# Patient Record
Sex: FEMALE | Race: WHITE | NOT HISPANIC OR LATINO | Employment: FULL TIME | URBAN - METROPOLITAN AREA
[De-identification: names, ages, dates, MRNs, and addresses within clinical notes are randomized per-mention and may not be internally consistent; named-entity substitution may affect disease eponyms.]

---

## 2017-10-17 ENCOUNTER — OFFICE VISIT (OUTPATIENT)
Dept: MEDICAL GROUP | Facility: MEDICAL CENTER | Age: 62
End: 2017-10-17
Payer: COMMERCIAL

## 2017-10-17 VITALS
SYSTOLIC BLOOD PRESSURE: 110 MMHG | BODY MASS INDEX: 23.24 KG/M2 | HEIGHT: 71 IN | WEIGHT: 166 LBS | RESPIRATION RATE: 20 BRPM | DIASTOLIC BLOOD PRESSURE: 78 MMHG | TEMPERATURE: 98.4 F | OXYGEN SATURATION: 97 % | HEART RATE: 78 BPM

## 2017-10-17 DIAGNOSIS — Z76.89 ENCOUNTER TO ESTABLISH CARE WITH NEW DOCTOR: ICD-10-CM

## 2017-10-17 DIAGNOSIS — A60.00 HERPES SIMPLEX INFECTION OF GENITOURINARY SYSTEM: ICD-10-CM

## 2017-10-17 PROCEDURE — 99203 OFFICE O/P NEW LOW 30 MIN: CPT | Performed by: INTERNAL MEDICINE

## 2017-10-17 RX ORDER — ACYCLOVIR 400 MG/1
400 TABLET ORAL 3 TIMES DAILY
Qty: 60 TAB | Refills: 1 | Status: SHIPPED | OUTPATIENT
Start: 2017-10-17 | End: 2018-11-17 | Stop reason: SDUPTHER

## 2017-10-17 RX ORDER — LATANOPROST 50 UG/ML
1 SOLUTION/ DROPS OPHTHALMIC NIGHTLY
COMMUNITY
End: 2022-01-12

## 2017-10-17 RX ORDER — ACYCLOVIR 400 MG/1
400 TABLET ORAL EVERY 8 HOURS PRN
COMMUNITY
End: 2019-05-07

## 2017-10-17 ASSESSMENT — PATIENT HEALTH QUESTIONNAIRE - PHQ9: CLINICAL INTERPRETATION OF PHQ2 SCORE: 0

## 2017-10-17 NOTE — LETTER
[facility]  Cookie Quesada M.D.  [address]  [place] [address]  Fax: [phone]   Authorization for Release/Disclosure of   Protected Health Information   Name: JAZ VIRGEN : 1955 SSN: [id number]   Address: [facility] [address]  [place] [address] Phone:    [phone] (home)    I authorize the entity listed below to release/disclose the PHI below to:   [facility] [employer] [facility]/Cookie Quesada M.D. and Cookie Quesada M.D.   Provider or Entity Name:     Address   City, State, Zip   Phone:      Fax:     Reason for request: continuity of care   Information to be released:    [  ] LAST COLONOSCOPY,  including any PATH REPORT and follow-up  [  ] LAST FIT/COLOGUARD RESULT [  ] LAST DEXA  [  ] LAST MAMMOGRAM  [  ] LAST PAP  [  ] LAST LABS [  ] RETINA EXAM REPORT  [  ] IMMUNIZATION RECORDS  [  ] Release all info      [  ] Check here and initial the line next to each item to release ALL health information INCLUDING  _____ Care and treatment for drug and / or alcohol abuse  _____ HIV testing, infection status, or AIDS  _____ Genetic Testing    DATES OF SERVICE OR TIME PERIOD TO BE DISCLOSED: _____________  I understand and acknowledge that:  * This Authorization may be revoked at any time by you in writing, except if your health information has already been used or disclosed.  * Your health information that will be used or disclosed as a result of you signing this authorization could be re-disclosed by the recipient. If this occurs, your re-disclosed health information may no longer be protected by State or Federal laws.  * You may refuse to sign this Authorization. Your refusal will not affect your ability to obtain treatment.  * This Authorization becomes effective upon signing and will  on (date) __________.      If no date is indicated, this Authorization will  one (1) year from the signature date.    Name: Jaz Virgen    Signature:   Date:     10/17/2017       PLEASE FAX  REQUESTED RECORDS BACK TO: [phone]

## 2017-10-17 NOTE — PROGRESS NOTES
Subjective:   Loli Pond is a 62 y.o. female here today to establish care and       \      1. Encounter to establish care with new doctor    Patient recently moved from the [facility] to [place]. Her partner has a business which relocated here. In general the patient is very healthy, she has no history of any significant health issues. She did have breast cyst which was aspirated several months ago, she is scheduled to go back for diagnostic mammogram and  repeat ultrasound in a couple of months. She takes eyedrops for glaucoma, that is her only routine medication.    2. Herpes simplex infection of genitourinary system    Patient has a history of HSV-2, she takes acyclovir as needed for outbreaks. The frequency of her outbreaks is variable usually every several months.      Current medicines (including changes today)  Current Outpatient Prescriptions   Medication Sig Dispense Refill   • latanoprost (XALATAN) 0.005 % Solution Place 1 Drop in both eyes every evening.     • acyclovir (ZOVIRAX) 400 MG tablet Take 1 Tab by mouth 3 times a day. 60 Tab 1     No current facility-administered medications for this visit.      She  has no past medical history on file.    Social History     Social History   • Marital status: Single     Spouse name: N/A   • Number of children: N/A   • Years of education: N/A     Social History Main Topics   • Smoking status: Never Smoker   • Smokeless tobacco: Never Used   • Alcohol use 1.2 oz/week     2 Glasses of wine per week   • Drug use: No   • Sexual activity: Yes     Partners: Male     Other Topics Concern   • Caffeine Concern No   • Sleep Concern No   • Weight Concern Yes   • Special Diet No   • Exercise Yes     Social History Narrative   • No narrative on file     Family History   Problem Relation Age of Onset   • Colon Cancer Mother    • Heart Attack Father        ROS       - Constitutional: Negative for fever, chills, unexpected weight change, and fatigue/generalized  "weakness.     - HEENT: Negative for headaches, vision changes      - Respiratory: Negative for cough, Shortness of breath    - Cardiovascular: Negative for chest pain and bilateral lower extremity edema.     - Gastrointestinal: Negative for heartburn,  abdominal pain,  diarrhea, constipation     - Genitourinary: Negative for dysuria  and urinary incontinence.    - Musculoskeletal: Negative for  back pain and joint pain.     - Skin: Negative for rash     - Neurological: Negative for dizziness,  tremors, focal weakness     - Psychiatric/Behavioral: Negative for depression and memory loss.             Objective:     Blood pressure 110/78, pulse 78, temperature 36.9 °C (98.4 °F), resp. rate 20, height 1.803 m (5' 11\"), weight 75.3 kg (166 lb), SpO2 97 %. Body mass index is 23.15 kg/m².     Physical Exam:  Constitutional: Alert, no distress.  Skin: Warm, dry, good turgor, no rashes in visible areas.  Eye: Equal, round and reactive, conjunctiva clear, lids normal.  ENMT: Lips without lesions, good dentition, oropharynx clear. Hearing grossly intact.  Neck: No masses, no thyromegaly. No cervical or supraclavicular lymphadenopathy  Respiratory: Unlabored respiratory effort, lungs clear to auscultation, no wheezes, no rhonchi.  Cardiovascular: Regular rate and rhythm, without murmur, no edema.  Abdomen: Soft, non-tender, no masses, no hepatosplenomegaly.  Psych: Alert and oriented x3, normal affect and mood. Insight and judgment good            Assessment and Plan:   The following treatment plan was discussed    1. Encounter to establish care with new doctor    At this time the patient is up-to-date on all of her routine health maintenance. She had a colonoscopy done just a couple of months ago and it was negative. We will obtain these records. She has no history of elevated cholesterol or blood glucose. The patient maintains an active lifestyle.    2. Herpes simplex infection of genitourinary system      - acyclovir " (ZOVIRAX) 400 MG tablet; Take 1 Tab by mouth 3 times a day.  Dispense: 60 Tab; Refill: 1      Followup: She will follow up annually sooner when necessary

## 2017-10-17 NOTE — LETTER
[facility]  Cookie Quesada M.D.  [address]  [place] [address]  Fax: [phone]   Authorization for Release/Disclosure of   Protected Health Information   Name: JAZ VIRGEN : 1955 SSN: [id number]   Address: [facility] [address]  [place] [address] Phone:    [phone] (home)    I authorize the entity listed below to release/disclose the PHI below to:   [facility]/Cookie Quesada M.D. and Cookie Quesada M.D.   Provider or Entity Name:     Address   City, State, Zip   Phone:      Fax:     Reason for request: continuity of care   Information to be released:    [  ] LAST COLONOSCOPY,  including any PATH REPORT and follow-up  [  ] LAST FIT/COLOGUARD RESULT [  ] LAST DEXA  [  ] LAST MAMMOGRAM  [  ] LAST PAP  [  ] LAST LABS [  ] RETINA EXAM REPORT  [  ] IMMUNIZATION RECORDS  [  ] Release all info      [  ] Check here and initial the line next to each item to release ALL health information INCLUDING  _____ Care and treatment for drug and / or alcohol abuse  _____ HIV testing, infection status, or AIDS  _____ Genetic Testing    DATES OF SERVICE OR TIME PERIOD TO BE DISCLOSED: _____________  I understand and acknowledge that:  * This Authorization may be revoked at any time by you in writing, except if your health information has already been used or disclosed.  * Your health information that will be used or disclosed as a result of you signing this authorization could be re-disclosed by the recipient. If this occurs, your re-disclosed health information may no longer be protected by State or Federal laws.  * You may refuse to sign this Authorization. Your refusal will not affect your ability to obtain treatment.  * This Authorization becomes effective upon signing and will  on (date) __________.      If no date is indicated, this Authorization will  one (1) year from the signature date.    Name: Jaz Virgen    Signature:   Date:     10/17/2017       PLEASE FAX  REQUESTED RECORDS BACK TO: [phone]

## 2017-12-20 ENCOUNTER — OFFICE VISIT (OUTPATIENT)
Dept: MEDICAL GROUP | Facility: MEDICAL CENTER | Age: 62
End: 2017-12-20
Payer: COMMERCIAL

## 2017-12-20 VITALS
HEIGHT: 71 IN | RESPIRATION RATE: 20 BRPM | DIASTOLIC BLOOD PRESSURE: 64 MMHG | HEART RATE: 104 BPM | SYSTOLIC BLOOD PRESSURE: 110 MMHG | BODY MASS INDEX: 22.82 KG/M2 | TEMPERATURE: 101.1 F | WEIGHT: 163 LBS | OXYGEN SATURATION: 95 %

## 2017-12-20 DIAGNOSIS — J11.1 INFLUENZA: ICD-10-CM

## 2017-12-20 PROBLEM — R05.9 COUGH: Status: ACTIVE | Noted: 2017-12-20

## 2017-12-20 LAB
FLUAV+FLUBV AG SPEC QL IA: NORMAL
INT CON NEG: NEGATIVE
INT CON POS: POSITIVE

## 2017-12-20 PROCEDURE — 87804 INFLUENZA ASSAY W/OPTIC: CPT | Performed by: FAMILY MEDICINE

## 2017-12-20 PROCEDURE — 99214 OFFICE O/P EST MOD 30 MIN: CPT | Performed by: FAMILY MEDICINE

## 2017-12-20 RX ORDER — OSELTAMIVIR PHOSPHATE 75 MG/1
75 CAPSULE ORAL 2 TIMES DAILY
Qty: 10 CAP | Refills: 0 | Status: SHIPPED | OUTPATIENT
Start: 2017-12-20 | End: 2018-03-15

## 2017-12-20 NOTE — PROGRESS NOTES
"[facility]  Progress Note  Established Patient    Subjective:   Loli Pond is a 62 y.o. female here today with a chief complaint of Cough. The patient is alone.     Influenza  The patient states that she has recently been spending a lot of time with her mother-in-law, however, her mother-in-law was just diagnosed with flu complicated by pneumonia and is currently hospitalized. Starting yesterday, the patient noticed some slight cough and congestion. The symptoms worsened this morning and she developed sudden onset myalgias with chills. She also has noticed a fever. She denies sore throat. She denies shortness of breath. She denies nausea, vomiting and diarrhea. She did not get her flu shot this year. She feels quite rundown. She does have some chest pain with the cough. She is not aware of any aggravating or alleviating factors. Her symptoms are quite severe.      Current Outpatient Prescriptions on File Prior to Visit   Medication Sig Dispense Refill   • latanoprost (XALATAN) 0.005 % Solution Place 1 Drop in both eyes every evening.     • acyclovir (ZOVIRAX) 400 MG tablet Take 1 Tab by mouth 3 times a day. 60 Tab 1     No current facility-administered medications on file prior to visit.        History reviewed. No pertinent past medical history.    Allergies: Latex and Tetanus toxoid    Surgical History:  has no past surgical history on file.    Family History: family history includes Colon Cancer in her mother; Heart Attack in her father.    Social History:  reports that she has never smoked. She has never used smokeless tobacco. She reports that she drinks about 1.2 oz of alcohol per week . She reports that she does not use drugs.    ROS:See history of present illness..        Objective:     Vitals:    12/20/17 1033 12/20/17 1058   BP: (!) 96/68 110/64   Pulse: (!) 104 (!) 104   Resp: 20    Temp: (!) 38.4 °C (101.1 °F)    SpO2: 95%    Weight: 73.9 kg (163 lb)    Height: 1.803 m (5' 11\")  "       Physical Exam:  General: Appears tired.  Cardio: regular rate and rhythm, no murmurs, rubs or gallops.   Resp: CTAB no w/r/r.   ENMT: Tympanic membranes normal bilaterally. No pharyngeal erythema, no tonsillar exudates. There is some anterior and posterior cervical lymphadenopathy. No woody induration to the floor of the mouth.  GI: Soft, nontender, nondistended.    Rapid flu: positive.    Assessment and Plan:     1. Influenza  The patient has influenza without evidence of suppurative complication.  - oseltamivir (TAMIFLU) 75 MG Cap; Take 1 Cap by mouth 2 times a day.  Dispense: 10 Cap; Refill: 0  - Discussed remaining hydrated, try to eat.  - ER precautions extensively discussed with patient, I don't see any indication for ER visit currently.  - Handout given.      Followup: Return if symptoms worsen or fail to improve.

## 2017-12-20 NOTE — PATIENT INSTRUCTIONS
"Influenza, Adult  Influenza (\"the flu\") is a viral infection of the respiratory tract. It occurs more often in winter months because people spend more time in close contact with one another. Influenza can make you feel very sick. Influenza easily spreads from person to person (contagious).  CAUSES   Influenza is caused by a virus that infects the respiratory tract. You can catch the virus by breathing in droplets from an infected person's cough or sneeze. You can also catch the virus by touching something that was recently contaminated with the virus and then touching your mouth, nose, or eyes.  RISKS AND COMPLICATIONS  You may be at risk for a more severe case of influenza if you smoke cigarettes, have diabetes, have chronic heart disease (such as heart failure) or lung disease (such as asthma), or if you have a weakened immune system. Elderly people and pregnant women are also at risk for more serious infections. The most common problem of influenza is a lung infection (pneumonia). Sometimes, this problem can require emergency medical care and may be life threatening.  SIGNS AND SYMPTOMS   Symptoms typically last 4 to 10 days and may include:  · Fever.  · Chills.  · Headache, body aches, and muscle aches.  · Sore throat.  · Chest discomfort and cough.  · Poor appetite.  · Weakness or feeling tired.  · Dizziness.  · Nausea or vomiting.  DIAGNOSIS   Diagnosis of influenza is often made based on your history and a physical exam. A nose or throat swab test can be done to confirm the diagnosis.  TREATMENT   In mild cases, influenza goes away on its own. Treatment is directed at relieving symptoms. For more severe cases, your health care provider may prescribe antiviral medicines to shorten the sickness. Antibiotic medicines are not effective because the infection is caused by a virus, not by bacteria.  HOME CARE INSTRUCTIONS  · Take medicines only as directed by your health care provider.  · Use a cool mist humidifier " to make breathing easier.  · Get plenty of rest until your temperature returns to normal. This usually takes 3 to 4 days.  · Drink enough fluid to keep your urine clear or pale yellow.  · Cover your mouth and nose when coughing or sneezing, and wash your hands well to prevent the virus from spreading.  · Stay home from work or school until the fever is gone for at least 1 full day.  PREVENTION   An annual influenza vaccination (flu shot) is the best way to avoid getting influenza. An annual flu shot is now routinely recommended for all adults in the [place]  SEEK MEDICAL CARE IF:  · You experience chest pain, your cough worsens, or you produce more mucus.  · You have nausea, vomiting, or diarrhea.  · Your fever returns or gets worse.  SEEK IMMEDIATE MEDICAL CARE IF:  · You have trouble breathing, you become short of breath, or your skin or nails become bluish.  · You have severe pain or stiffness in the neck.  · You develop a sudden headache, or pain in the face or ear.  · You have nausea or vomiting that you cannot control.  MAKE SURE YOU:   · Understand these instructions.  · Will watch your condition.  · Will get help right away if you are not doing well or get worse.     This information is not intended to replace advice given to you by your health care provider. Make sure you discuss any questions you have with your health care provider.     Document Released: 12/15/2001 Document Revised: 01/08/2016 Document Reviewed: 03/18/2013  [employer] Patient Education ©2016 [employer]

## 2017-12-20 NOTE — ASSESSMENT & PLAN NOTE
The patient states that she has recently been spending a lot of time with her mother-in-law, however, her mother-in-law was just diagnosed with flu complicated by pneumonia and is currently hospitalized. Starting yesterday, the patient noticed some slight cough and congestion. The symptoms worsened this morning and she developed sudden onset myalgias with chills. She also has noticed a fever. She denies sore throat. She denies shortness of breath. She denies nausea, vomiting and diarrhea. She did not get her flu shot this year. She feels quite rundown. She does have some chest pain with the cough. She is not aware of any aggravating or alleviating factors. Her symptoms are quite severe.

## 2017-12-21 ENCOUNTER — TELEPHONE (OUTPATIENT)
Dept: MEDICAL GROUP | Facility: MEDICAL CENTER | Age: 62
End: 2017-12-21

## 2017-12-21 NOTE — TELEPHONE ENCOUNTER
Called patient to see how she was doing. No answer. Left message for her to return the call if she had any concerns.

## 2017-12-22 NOTE — TELEPHONE ENCOUNTER
Patient called and wants you to know she is feeling better and wants to thank you . Still has congestion but better

## 2018-03-15 ENCOUNTER — OFFICE VISIT (OUTPATIENT)
Dept: MEDICAL GROUP | Facility: MEDICAL CENTER | Age: 63
End: 2018-03-15
Payer: COMMERCIAL

## 2018-03-15 VITALS
WEIGHT: 160.94 LBS | TEMPERATURE: 98 F | SYSTOLIC BLOOD PRESSURE: 112 MMHG | BODY MASS INDEX: 22.53 KG/M2 | RESPIRATION RATE: 18 BRPM | DIASTOLIC BLOOD PRESSURE: 78 MMHG | HEART RATE: 76 BPM | HEIGHT: 71 IN | OXYGEN SATURATION: 93 %

## 2018-03-15 DIAGNOSIS — F32.A DEPRESSION, UNSPECIFIED DEPRESSION TYPE: ICD-10-CM

## 2018-03-15 DIAGNOSIS — Z72.89 OTHER PROBLEMS RELATED TO LIFESTYLE: ICD-10-CM

## 2018-03-15 DIAGNOSIS — Z00.00 HEALTHCARE MAINTENANCE: ICD-10-CM

## 2018-03-15 DIAGNOSIS — Z13.6 SCREENING FOR ISCHEMIC HEART DISEASE: ICD-10-CM

## 2018-03-15 DIAGNOSIS — A60.00 HERPES SIMPLEX INFECTION OF GENITOURINARY SYSTEM: ICD-10-CM

## 2018-03-15 DIAGNOSIS — L82.1 SEBORRHEIC KERATOSES: ICD-10-CM

## 2018-03-15 DIAGNOSIS — Z13.1 SCREENING FOR DIABETES MELLITUS: ICD-10-CM

## 2018-03-15 PROBLEM — J11.1 INFLUENZA: Status: RESOLVED | Noted: 2017-12-20 | Resolved: 2018-03-15

## 2018-03-15 PROCEDURE — 99214 OFFICE O/P EST MOD 30 MIN: CPT | Performed by: FAMILY MEDICINE

## 2018-03-15 NOTE — ASSESSMENT & PLAN NOTE
"Lipids: Ordered.  Fasting Glucose: Ordered.  Hepatitis C Screen: Ordered.  Colonoscopy: done 4/19/17 with 5 year recall, see \"media\".  Mammogram: done 2016 with BiRADS2, has known benign mass.  Pap: See media, will return for wellness exam.  "

## 2018-03-15 NOTE — ASSESSMENT & PLAN NOTE
Patient gets about 6 or 7 outbreaks of genital herpes per year. She has as needed acyclovir on hand for these issues.

## 2018-03-15 NOTE — PROGRESS NOTES
"[facility]  Progress Note  Established Patient    Subjective:   Loli Pond is a 62 y.o. female here today with a chief complaint of depression. The patient is alone.     Healthcare maintenance  Lipids: Ordered.  Fasting Glucose: Ordered.  Hepatitis C Screen: Ordered.  Colonoscopy: done 4/19/17 with 5 year recall, see \"media\".  Mammogram: done 2016 with BiRADS2, has known benign mass.  Pap: See media, will return for wellness exam.    Depression  Since January the patient has been having problems with her partner. She states that they met in 2009 and living together since 2011, however, she doesn't know if the relationship is going to make it. This creates some financial uncertainly which causes her some anxiety. She does tend to confide in her friends from [facility] and in her brother. She doesn't regularly drink to excess, however, last night she did drink a lot. She denies suicidal ideation. She has been seeing a counselor, which she finds helpful. She doesn't think she needs medication.    Herpes simplex infection of genitourinary system  Patient gets about 6 or 7 outbreaks of genital herpes per year. She has as needed acyclovir on hand for these issues.    Seborrheic keratoses  Patient has 2 skin lesions on her back that she would like me to evaluate. She believes that they're getting bigger.      Current Outpatient Prescriptions on File Prior to Visit   Medication Sig Dispense Refill   • latanoprost (XALATAN) 0.005 % Solution Place 1 Drop in both eyes every evening.     • acyclovir (ZOVIRAX) 400 MG tablet Take 1 Tab by mouth 3 times a day. 60 Tab 1     No current facility-administered medications on file prior to visit.        Past Medical History:   Diagnosis Date   • Glaucoma        Allergies: Latex and Tetanus toxoid    Surgical History:  has a past surgical history that includes primary c section.    Family History: family history includes Colon Cancer in her mother; Heart Attack in " "her father.    Social History:  reports that she has never smoked. She has never used smokeless tobacco. She reports that she drinks about 1.2 oz of alcohol per week . She reports that she does not use drugs.    ROS: No chest pain or shortness of breath. No suicidal ideation..        Objective:     Vitals:    03/15/18 0834   BP: 112/78   Pulse: 76   Resp: 18   Temp: 36.7 °C (98 °F)   SpO2: 93%   Weight: 73 kg (160 lb 15 oz)   Height: 1.791 m (5' 10.5\")       Physical Exam:  General: alert in no apparent distress.   Cardio: regular rate and rhythm, no murmurs, rubs or gallops.   Resp: CTAB no w/r/r.   Mood: Depressed. Affect: Normal.  Skin: To moderately sized pigmented skin lesions on the back with a \"stuck on\" appearance.        Assessment and Plan:     1. Healthcare maintenance  - see HPI.   - return for wellness exam.     2. Seborrheic keratoses  Discussed options including biopsy, cryotherapy. The patient would like to see dermatology.  - REFERRAL TO DERMATOLOGY    3. Depression, unspecified depression type  No suicidal ideation. Patient is seeing a counselor. Did discuss confiding in family members and friends during this difficult time, exercising. Advised that she should go to the ER with any suicidal ideation. Patient not interested in medication.  -Continue counseling.  - TSH WITH REFLEX TO FT4; Future    4. Herpes simplex infection of genitourinary system  - Continue as needed acyclovir.    5. Screening for ischemic heart disease  - LIPID PROFILE; Future    6. Screening for diabetes mellitus  - BASIC METABOLIC PANEL; Future    7. Other problems related to lifestyle  - HEP C VIRUS ANTIBODY; Future        Followup: Return in about 4 weeks (around 4/12/2018), or if symptoms worsen or fail to improve, for Wellness Visit, Long.         "

## 2018-03-15 NOTE — ASSESSMENT & PLAN NOTE
Patient has 2 skin lesions on her back that she would like me to evaluate. She believes that they're getting bigger.

## 2018-03-15 NOTE — ASSESSMENT & PLAN NOTE
Since January the patient has been having problems with her partner. She states that they met in 2009 and living together since 2011, however, she doesn't know if the relationship is going to make it. This creates some financial uncertainly which causes her some anxiety. She does tend to confide in her friends from [facility] and in her brother. She doesn't regularly drink to excess, however, last night she did drink a lot. She denies suicidal ideation. She has been seeing a counselor, which she finds helpful. She doesn't think she needs medication.

## 2018-04-10 ENCOUNTER — TELEPHONE (OUTPATIENT)
Dept: MEDICAL GROUP | Facility: MEDICAL CENTER | Age: 63
End: 2018-04-10

## 2018-04-10 NOTE — TELEPHONE ENCOUNTER
Left a Vm with Laura is referrals department regarding patients referrals. Patient states her insurance doesn't not cover this Dermatology referral

## 2018-05-04 ENCOUNTER — HOSPITAL ENCOUNTER (OUTPATIENT)
Dept: LAB | Facility: MEDICAL CENTER | Age: 63
End: 2018-05-04
Attending: FAMILY MEDICINE
Payer: COMMERCIAL

## 2018-05-04 DIAGNOSIS — Z13.6 SCREENING FOR ISCHEMIC HEART DISEASE: ICD-10-CM

## 2018-05-04 DIAGNOSIS — Z72.89 OTHER PROBLEMS RELATED TO LIFESTYLE: ICD-10-CM

## 2018-05-04 DIAGNOSIS — F32.A DEPRESSION, UNSPECIFIED DEPRESSION TYPE: ICD-10-CM

## 2018-05-04 DIAGNOSIS — Z13.1 SCREENING FOR DIABETES MELLITUS: ICD-10-CM

## 2018-05-04 LAB
ANION GAP SERPL CALC-SCNC: 7 MMOL/L (ref 0–11.9)
BUN SERPL-MCNC: 12 MG/DL (ref 8–22)
CALCIUM SERPL-MCNC: 9.6 MG/DL (ref 8.5–10.5)
CHLORIDE SERPL-SCNC: 104 MMOL/L (ref 96–112)
CHOLEST SERPL-MCNC: 175 MG/DL (ref 100–199)
CO2 SERPL-SCNC: 27 MMOL/L (ref 20–33)
CREAT SERPL-MCNC: 0.82 MG/DL (ref 0.5–1.4)
GLUCOSE SERPL-MCNC: 78 MG/DL (ref 65–99)
HCV AB SER QL: NEGATIVE
HDLC SERPL-MCNC: 97 MG/DL
LDLC SERPL CALC-MCNC: 69 MG/DL
POTASSIUM SERPL-SCNC: 4.6 MMOL/L (ref 3.6–5.5)
SODIUM SERPL-SCNC: 138 MMOL/L (ref 135–145)
TRIGL SERPL-MCNC: 47 MG/DL (ref 0–149)
TSH SERPL DL<=0.005 MIU/L-ACNC: 1.6 UIU/ML (ref 0.38–5.33)

## 2018-05-04 PROCEDURE — 36415 COLL VENOUS BLD VENIPUNCTURE: CPT

## 2018-05-04 PROCEDURE — 80048 BASIC METABOLIC PNL TOTAL CA: CPT

## 2018-05-04 PROCEDURE — 86803 HEPATITIS C AB TEST: CPT

## 2018-05-04 PROCEDURE — 84443 ASSAY THYROID STIM HORMONE: CPT

## 2018-05-04 PROCEDURE — 80061 LIPID PANEL: CPT

## 2018-05-15 ENCOUNTER — OFFICE VISIT (OUTPATIENT)
Dept: MEDICAL GROUP | Facility: MEDICAL CENTER | Age: 63
End: 2018-05-15
Payer: COMMERCIAL

## 2018-05-15 VITALS
TEMPERATURE: 98.5 F | SYSTOLIC BLOOD PRESSURE: 104 MMHG | DIASTOLIC BLOOD PRESSURE: 70 MMHG | WEIGHT: 171.96 LBS | BODY MASS INDEX: 24.07 KG/M2 | HEART RATE: 73 BPM | HEIGHT: 71 IN | OXYGEN SATURATION: 98 %

## 2018-05-15 DIAGNOSIS — M79.672 PAIN OF LEFT HEEL: ICD-10-CM

## 2018-05-15 DIAGNOSIS — R21 RASH: ICD-10-CM

## 2018-05-15 DIAGNOSIS — R04.0 EPISTAXIS: ICD-10-CM

## 2018-05-15 DIAGNOSIS — Z12.31 SCREENING MAMMOGRAM, ENCOUNTER FOR: ICD-10-CM

## 2018-05-15 DIAGNOSIS — B35.3 TINEA PEDIS OF RIGHT FOOT: ICD-10-CM

## 2018-05-15 DIAGNOSIS — L82.1 SEBORRHEIC KERATOSES: ICD-10-CM

## 2018-05-15 DIAGNOSIS — Z00.00 HEALTHCARE MAINTENANCE: ICD-10-CM

## 2018-05-15 PROBLEM — L98.9 SKIN LESION: Status: ACTIVE | Noted: 2018-05-15

## 2018-05-15 PROCEDURE — 99214 OFFICE O/P EST MOD 30 MIN: CPT | Mod: 25 | Performed by: FAMILY MEDICINE

## 2018-05-15 PROCEDURE — 99396 PREV VISIT EST AGE 40-64: CPT | Mod: 25 | Performed by: FAMILY MEDICINE

## 2018-05-15 RX ORDER — CLOTRIMAZOLE 1 %
CREAM (GRAM) TOPICAL
Qty: 30 G | Refills: 1 | Status: SHIPPED | OUTPATIENT
Start: 2018-05-15 | End: 2022-01-12

## 2018-05-15 RX ORDER — TRIAMCINOLONE ACETONIDE 1 MG/G
CREAM TOPICAL
Qty: 30 G | Refills: 1 | Status: SHIPPED | OUTPATIENT
Start: 2018-05-15 | End: 2022-01-12

## 2018-05-15 NOTE — ASSESSMENT & PLAN NOTE
The patient describes a one month history of left heel pain without associated trauma. The pain is worse in the morning and gets better as she walks on the heel.

## 2018-05-15 NOTE — ASSESSMENT & PLAN NOTE
"The patient has a pigmented, \"stuck on\" skin lesion on her back that she would like removed. She thinks it is getting larger and it is bothersome to her. She was going to see a dermatologist for this purpose but states it is taking too long to get in and she would like me to remove it. She does not want cryotherapy, she wants it removed with a shave biopsy.  "

## 2018-05-15 NOTE — ASSESSMENT & PLAN NOTE
Between the first and second toe on the right foot, the patient has noticed some peeling and slight redness.

## 2018-05-15 NOTE — ASSESSMENT & PLAN NOTE
"Lipids: done 2018 and normal.   Fasting Glucose: done 2018 and normal.   Hepatitis C Screen: done 2018 and normal.   Colonoscopy: done 4/19/17 with 5 year recall, see \"media\".  Mammogram: ordered.   Pap: done 2016 with cotesting and normal.    Tdap: patient states she is allergic.   Shingrix: declines.  "

## 2018-05-15 NOTE — PROGRESS NOTES
"[facility]  Progress Note  Established Patient    Subjective:   Loli Pond is a 62 y.o. female here today with a chief complaint of foot pain and for a wellness exam. The patient is alone.     Rash  Patient has a nonpainful, nonpruritic abdominal rash that is an ongoing for the past month. She thinks it is getting better. She has not tried any alleviating factors.    Seborrheic keratoses  The patient has a pigmented, \"stuck on\" skin lesion on her back that she would like removed. She thinks it is getting larger and it is bothersome to her. She was going to see a dermatologist for this purpose but states it is taking too long to get in and she would like me to remove it. She does not want cryotherapy, she wants it removed with a shave biopsy.    Epistaxis  Since moving here in July 2017, the patient notices that every time she blows her nose, there is blood on the tissue paper. She does not have any large volume bleeding. She is not using any alleviating factors.    Tinea pedis of right foot  Between the first and second toe on the right foot, the patient has noticed some peeling and slight redness.    Pain of left heel  The patient describes a one month history of left heel pain without associated trauma. The pain is worse in the morning and gets better as she walks on the heel.    Healthcare maintenance  Lipids: done 2018 and normal.   Fasting Glucose: done 2018 and normal.   Hepatitis C Screen: done 2018 and normal.   Colonoscopy: done 4/19/17 with 5 year recall, see \"media\".  Mammogram: ordered.   Pap: done 2016 with cotesting and normal.    Tdap: patient states she is allergic.   Shingrix: declines.      Current Outpatient Prescriptions on File Prior to Visit   Medication Sig Dispense Refill   • latanoprost (XALATAN) 0.005 % Solution Place 1 Drop in both eyes every evening.     • acyclovir (ZOVIRAX) 400 MG tablet Take 1 Tab by mouth 3 times a day. 60 Tab 1     No current facility-administered " "medications on file prior to visit.        Past Medical History:   Diagnosis Date   • Glaucoma        Allergies: Latex and Tetanus toxoid    Surgical History:  has a past surgical history that includes primary c section.    Family History: family history includes Colon Cancer in her mother; Heart Attack in her father.    Social History:  reports that she has never smoked. She has never used smokeless tobacco. She reports that she drinks about 1.2 oz of alcohol per week . She reports that she does not use drugs.    ROS: no CP or SOB.        Objective:     Vitals:    05/15/18 1049   BP: 104/70   Pulse: 73   Temp: 36.9 °C (98.5 °F)   SpO2: 98%   Weight: 78 kg (171 lb 15.3 oz)   Height: 1.791 m (5' 10.5\")       Physical Exam:  General: alert in no apparent distress.   Cardio: regular rate and rhythm, no murmurs, rubs or gallops.   Resp: CTAB no w/r/r.   Skin: Punctate, generalized abdominal rash without spreading redness, tenderness or warmth. Between the right first and second toe there is an area of peeling with slight peripheral redness. On the back there is a 0.3 cm x 0.7 cm pigmented skin lesion within \"stuck on\" appearance.  ENMT: Normal nasal turbinate bilaterally without obvious bleeding. No pharyngeal bleeding.  Feet: Bilateral feet with 2+ DP pulses, no tenderness over the navicular bones, fifth metatarsals or bilateral malleoli. No tenderness over the medial Tuberosity bilaterally.        Assessment and Plan:     1. Rash  Uncertain etiology. Seems consistent with pityriasis but I would expect to be gone by now. We'll do a trial of triamcinolone. Patient will return if no improvement.  - triamcinolone acetonide (KENALOG) 0.1 % Cream; Apply a thin layer to affected area BID x 10 days  Dispense: 30 g; Refill: 1    2. Seborrheic keratoses  - Return for shave biopsy.    3. Epistaxis  Exam normal. Suspect due to dry air.  - humidifier.   - ponaris.   - return if not better.     4. Pain of left heel  Exam normal, " history most consistent with plantar fasciitis.  - stretching, discussed how to do.   - heel cups.   - return if not better.     5. Tinea pedis of right foot  - clotrimazole (LOTRIMIN) 1 % Cream; Apply a thin layer to affected area on feet BID until rash gone, then two days later. Don't use longer than 14 days.  Dispense: 30 g; Refill: 1    6. Screening mammogram, encounter for  - MA-SCREEN MAMMO W/CAD-BILAT; Future    7. Healthcare maintenance  - see HPI.   - discussed diet and exercise.         Followup: Return in about 4 weeks (around 6/12/2018), or if symptoms worsen or fail to improve.

## 2018-05-15 NOTE — ASSESSMENT & PLAN NOTE
Since moving here in July 2017, the patient notices that every time she blows her nose, there is blood on the tissue paper. She does not have any large volume bleeding. She is not using any alleviating factors.

## 2018-05-15 NOTE — ASSESSMENT & PLAN NOTE
Patient has a nonpainful, nonpruritic abdominal rash that is an ongoing for the past month. She thinks it is getting better. She has not tried any alleviating factors.

## 2018-05-23 ENCOUNTER — HOSPITAL ENCOUNTER (OUTPATIENT)
Dept: RADIOLOGY | Facility: MEDICAL CENTER | Age: 63
End: 2018-05-23
Attending: FAMILY MEDICINE
Payer: COMMERCIAL

## 2018-05-23 DIAGNOSIS — Z12.31 SCREENING MAMMOGRAM, ENCOUNTER FOR: ICD-10-CM

## 2018-05-23 PROCEDURE — 77067 SCR MAMMO BI INCL CAD: CPT

## 2018-05-29 ENCOUNTER — HOSPITAL ENCOUNTER (OUTPATIENT)
Dept: RADIOLOGY | Facility: MEDICAL CENTER | Age: 63
End: 2018-05-29

## 2018-07-16 ENCOUNTER — HOSPITAL ENCOUNTER (OUTPATIENT)
Facility: MEDICAL CENTER | Age: 63
End: 2018-07-16
Attending: FAMILY MEDICINE
Payer: COMMERCIAL

## 2018-07-16 ENCOUNTER — OFFICE VISIT (OUTPATIENT)
Dept: MEDICAL GROUP | Facility: MEDICAL CENTER | Age: 63
End: 2018-07-16
Payer: COMMERCIAL

## 2018-07-16 VITALS
RESPIRATION RATE: 18 BRPM | SYSTOLIC BLOOD PRESSURE: 110 MMHG | OXYGEN SATURATION: 97 % | WEIGHT: 166.01 LBS | DIASTOLIC BLOOD PRESSURE: 70 MMHG | HEIGHT: 71 IN | TEMPERATURE: 98 F | BODY MASS INDEX: 23.24 KG/M2 | HEART RATE: 74 BPM

## 2018-07-16 DIAGNOSIS — B35.3 TINEA PEDIS OF RIGHT FOOT: ICD-10-CM

## 2018-07-16 DIAGNOSIS — R21 RASH: ICD-10-CM

## 2018-07-16 DIAGNOSIS — L98.9 SKIN LESION: ICD-10-CM

## 2018-07-16 DIAGNOSIS — M79.672 PAIN OF LEFT HEEL: ICD-10-CM

## 2018-07-16 DIAGNOSIS — L82.1 SEBORRHEIC KERATOSES: ICD-10-CM

## 2018-07-16 DIAGNOSIS — R04.0 EPISTAXIS: ICD-10-CM

## 2018-07-16 PROCEDURE — 88305 TISSUE EXAM BY PATHOLOGIST: CPT

## 2018-07-16 PROCEDURE — 99213 OFFICE O/P EST LOW 20 MIN: CPT | Mod: 25 | Performed by: FAMILY MEDICINE

## 2018-07-16 PROCEDURE — 11301 SHAVE SKIN LESION 0.6-1.0 CM: CPT | Performed by: FAMILY MEDICINE

## 2018-07-16 NOTE — ASSESSMENT & PLAN NOTE
At the last visit the patient describes some epistaxis. After our interventions, this has resolved.

## 2018-07-16 NOTE — PROGRESS NOTES
[facility]  Progress Note  Established Patient    Subjective:   Loli Pond is a 62 y.o. female here today with a chief complaint of skin lesion. The patient is alone.     Rash  At the last visit the patient described a rash. This has resolved.    Epistaxis  At the last visit the patient describes some epistaxis. After our interventions, this has resolved.    Pain of left heel  Last visit the patient describes some left heel pain. After interventions, this has resolved.    Tinea pedis of right foot  At last visit I prescribed antifungals for the patient's tinea pedis. This issue has resolved.    Skin lesion  Today the patient comes in complaining of right mid back skin lesion which the patient believes has changed in quality and is bothersome to her. I discussed with her that I believe that this is a seborrheic keratosis and offered cryotherapy. The patient declines and would like a shave biopsy to confirm there is no cancer, which is a reasonable request. She understands that this will cause a worse cosmetic outcome.      Current Outpatient Prescriptions on File Prior to Visit   Medication Sig Dispense Refill   • latanoprost (XALATAN) 0.005 % Solution Place 1 Drop in both eyes every evening.     • acyclovir (ZOVIRAX) 400 MG tablet Take 1 Tab by mouth 3 times a day. 60 Tab 1   • triamcinolone acetonide (KENALOG) 0.1 % Cream Apply a thin layer to affected area BID x 10 days 30 g 1   • clotrimazole (LOTRIMIN) 1 % Cream Apply a thin layer to affected area on feet BID until rash gone, then two days later. Don't use longer than 14 days. 30 g 1     No current facility-administered medications on file prior to visit.        Past Medical History:   Diagnosis Date   • Glaucoma        Allergies: Latex and Tetanus toxoid    Surgical History:  has a past surgical history that includes primary c section.    Family History: family history includes Colon Cancer in her mother; Heart Attack in her father.    Social  "History:  reports that she has never smoked. She has never used smokeless tobacco. She reports that she drinks about 1.2 oz of alcohol per week . She reports that she does not use drugs.    ROS: no fever or nausea.        Objective:     Vitals:    07/16/18 0836   BP: 110/70   Pulse: 74   Resp: 18   Temp: 36.7 °C (98 °F)   SpO2: 97%   Weight: 75.3 kg (166 lb 0.1 oz)   Height: 1.791 m (5' 10.5\")       Physical Exam:  General: alert in no apparent distress.   Skin: 0.7 x 0.3 cm right midback pigmented skin lesion with \"stuck on\" appearance. No redness, swelling, warmth, induration or crepitus.     Shave Biopsy Procedure Note:  Informed consent was obtained. The area was cleansed with betadine and 1% lidocaine with epinephrine was used for local anesthesia. A shave biopsy was taken. Minimal bleeding was encountered and hemostasis was achieved with electrocautery. There were no complications. The area was dressed with bacitracin and a Band-Aid. Aftercare was discussed with the patient.        Assessment and Plan:     1. Skin lesion  - shave biopsy (see procedure note above).     Pathology Update:   A. Right back skin biopsy:         Seborrheic keratosis with chronic inflammation.         Negative for malignancy.    2. Rash  - resolved.     3. Epistaxis  - resolved.     4. Pain of left heel  - resolved.     5. Tinea pedis of right foot  - resolved.         Followup: Return in about 3 months (around 10/16/2018), or if symptoms worsen or fail to improve.         "

## 2018-07-16 NOTE — ASSESSMENT & PLAN NOTE
Today the patient comes in complaining of right mid back skin lesion which the patient believes has changed in quality and is bothersome to her. I discussed with her that I believe that this is a seborrheic keratosis and offered cryotherapy. The patient declines and would like a shave biopsy to confirm there is no cancer, which is a reasonable request. She understands that this will cause a worse cosmetic outcome.

## 2018-09-06 ENCOUNTER — OFFICE VISIT (OUTPATIENT)
Dept: DERMATOLOGY | Facility: IMAGING CENTER | Age: 63
End: 2018-09-06
Payer: COMMERCIAL

## 2018-09-06 VITALS — WEIGHT: 167 LBS | TEMPERATURE: 98.5 F | BODY MASS INDEX: 23.38 KG/M2 | HEIGHT: 71 IN

## 2018-09-06 DIAGNOSIS — L11.1 GROVER'S DISEASE: ICD-10-CM

## 2018-09-06 DIAGNOSIS — L81.4 LENTIGINES: ICD-10-CM

## 2018-09-06 DIAGNOSIS — L82.1 SEBORRHEIC KERATOSES: ICD-10-CM

## 2018-09-06 PROCEDURE — 99203 OFFICE O/P NEW LOW 30 MIN: CPT | Performed by: DERMATOLOGY

## 2018-09-06 ASSESSMENT — ENCOUNTER SYMPTOMS
CHILLS: 0
FEVER: 0

## 2018-09-06 NOTE — PROGRESS NOTES
Dermatology New Patient Visit    Chief Complaint   Patient presents with   • Skin Lesion       Subjective:     HPI:   Loli Pond is a 63 y.o. female presenting for    Few areas of concern.    HPI/location: lesion, chest   Time present: just noticed it   Painful lesion: No  Itching lesion: No  Enlarging lesion: No  Anything make it better or worse?    HPI/location: lesion right thigh  Time present: over a year  Painful lesion: No  Itching lesion: No  Enlarging lesion: Yes, has gotten a bit thicker   Anything make it better or worse? no    HPI:rash  Under breasts  Onset: years  Symptoms: red bumps  Aggravating factors: n/a  Alleviating factors: tac 0.1% cream  New creams/topicals: none  Treatments: above    HPI/location: rough spots on the back  Time present: unsure, years  Painful lesion: No  Itching lesion: No  Enlarging lesion: No - had one recently removed by PCP, well-healed  Anything make it better or worse? n/a    History of skin cancer: No  History of precancers/actinic keratoses: Yes, in 1990's  History of biopsies:Yes, Details: SK on back  History of blistering/severe sunburns:Yes, sunburns  Family history of skin cancer:No  Family history of atypical moles:No        Past Medical History:   Diagnosis Date   • Glaucoma        Current Outpatient Prescriptions on File Prior to Visit   Medication Sig Dispense Refill   • triamcinolone acetonide (KENALOG) 0.1 % Cream Apply a thin layer to affected area BID x 10 days 30 g 1   • clotrimazole (LOTRIMIN) 1 % Cream Apply a thin layer to affected area on feet BID until rash gone, then two days later. Don't use longer than 14 days. 30 g 1   • latanoprost (XALATAN) 0.005 % Solution Place 1 Drop in both eyes every evening.     • acyclovir (ZOVIRAX) 400 MG tablet Take 1 Tab by mouth 3 times a day. 60 Tab 1     No current facility-administered medications on file prior to visit.        Allergies   Allergen Reactions   • Latex    • Tetanus Toxoid        Family History  "  Problem Relation Age of Onset   • Colon Cancer Mother    • Heart Attack Father        Social History     Social History   • Marital status: Single     Spouse name: N/A   • Number of children: N/A   • Years of education: N/A     Occupational History   • Not on file.     Social History Main Topics   • Smoking status: Never Smoker   • Smokeless tobacco: Never Used   • Alcohol use 1.2 oz/week     2 Glasses of wine per week   • Drug use: No   • Sexual activity: Yes     Other Topics Concern   • Caffeine Concern No   • Sleep Concern No   • Weight Concern Yes   • Special Diet No   • Exercise Yes     Social History Narrative   • No narrative on file       Review of Systems   Constitutional: Negative for chills and fever.   Skin: Negative for itching and rash.   All other systems reviewed and are negative.       Objective:     A focused cutaneous exam was completed including: face, eyelids, conjunctiva, lips, neck, upper chestupper back, left and right upper extremities (including hands/digits and fingernails), left and right thighs with the following pertinent findings listed below. Remaining above-listed examined areas within normal limits / negative for rashes or lesions.    Temperature 36.9 °C (98.5 °F), height 1.791 m (5' 10.5\"), weight 75.8 kg (167 lb).    Physical Exam   Constitutional: She is oriented to person, place, and time and well-developed, well-nourished, and in no distress.   HENT:   Head: Normocephalic and atraumatic.   Eyes: Conjunctivae and lids are normal.   Neck: Normal range of motion.   Pulmonary/Chest: Effort normal.   Neurological: She is alert and oriented to person, place, and time.   Skin: Skin is warm and dry.        Psychiatric: Mood and affect normal.   Vitals reviewed.      DATA: none applicable to review    Assessment and Plan:     1. Seborrheic keratoses  - Benign-appearing nature of lesions discussed. Advised to return to clinic for any new or concerning changes.    2. Lentigines  - " Benign-appearing nature of lesions discussed. Advised to return to clinic for any new or concerning changes.    3. 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 Use to area on the chest for 1-2 weeks, if no improvement, she will be coming back to biopsy. Patient instructed to avoid face, axilla, and groin area. Side effects discussed, including skin thinning, appearance of stretch marks (striae), easy bruising, telangiectasias, and possible increased hair growth     Followup: Return in about 6 weeks (around 10/18/2018) for spot check.    Sarah Posada M.D.

## 2018-10-16 ENCOUNTER — APPOINTMENT (OUTPATIENT)
Dept: MEDICAL GROUP | Facility: MEDICAL CENTER | Age: 63
End: 2018-10-16
Payer: COMMERCIAL

## 2018-10-17 ENCOUNTER — OFFICE VISIT (OUTPATIENT)
Dept: DERMATOLOGY | Facility: IMAGING CENTER | Age: 63
End: 2018-10-17
Payer: COMMERCIAL

## 2018-10-17 ENCOUNTER — OFFICE VISIT (OUTPATIENT)
Dept: MEDICAL GROUP | Facility: MEDICAL CENTER | Age: 63
End: 2018-10-17
Payer: COMMERCIAL

## 2018-10-17 VITALS
SYSTOLIC BLOOD PRESSURE: 102 MMHG | DIASTOLIC BLOOD PRESSURE: 60 MMHG | OXYGEN SATURATION: 96 % | TEMPERATURE: 97.5 F | HEART RATE: 73 BPM | WEIGHT: 172 LBS | BODY MASS INDEX: 24.08 KG/M2 | RESPIRATION RATE: 18 BRPM | HEIGHT: 71 IN

## 2018-10-17 DIAGNOSIS — L82.1 SEBORRHEIC KERATOSES: ICD-10-CM

## 2018-10-17 DIAGNOSIS — F32.A DEPRESSION, UNSPECIFIED DEPRESSION TYPE: ICD-10-CM

## 2018-10-17 PROBLEM — L98.9 SKIN LESION: Status: RESOLVED | Noted: 2018-07-16 | Resolved: 2018-10-17

## 2018-10-17 PROCEDURE — 99213 OFFICE O/P EST LOW 20 MIN: CPT | Performed by: FAMILY MEDICINE

## 2018-10-17 PROCEDURE — 99212 OFFICE O/P EST SF 10 MIN: CPT | Performed by: DERMATOLOGY

## 2018-10-17 ASSESSMENT — PATIENT HEALTH QUESTIONNAIRE - PHQ9
7. TROUBLE CONCENTRATING ON THINGS, SUCH AS READING THE NEWSPAPER OR WATCHING TELEVISION: SEVERAL DAYS
8. MOVING OR SPEAKING SO SLOWLY THAT OTHER PEOPLE COULD HAVE NOTICED. OR THE OPPOSITE, BEING SO FIGETY OR RESTLESS THAT YOU HAVE BEEN MOVING AROUND A LOT MORE THAN USUAL: NOT AT ALL
4. FEELING TIRED OR HAVING LITTLE ENERGY: NOT AT ALL
3. TROUBLE FALLING OR STAYING ASLEEP OR SLEEPING TOO MUCH: SEVERAL DAYS
9. THOUGHTS THAT YOU WOULD BE BETTER OFF DEAD, OR OF HURTING YOURSELF: NOT AT ALL
6. FEELING BAD ABOUT YOURSELF - OR THAT YOU ARE A FAILURE OR HAVE LET YOURSELF OR YOUR FAMILY DOWN: SEVERAL DAYS
SUM OF ALL RESPONSES TO PHQ QUESTIONS 1-9: 4
5. POOR APPETITE OR OVEREATING: SEVERAL DAYS
SUM OF ALL RESPONSES TO PHQ9 QUESTIONS 1 AND 2: 0
1. LITTLE INTEREST OR PLEASURE IN DOING THINGS: NOT AT ALL
2. FEELING DOWN, DEPRESSED, IRRITABLE, OR HOPELESS: NOT AT ALL

## 2018-10-17 ASSESSMENT — ENCOUNTER SYMPTOMS
FEVER: 0
CHILLS: 0

## 2018-10-17 NOTE — PROGRESS NOTES
Dermatology Return Patient Visit    Chief Complaint   Patient presents with   • Follow-Up       Subjective:     HPI:   Loli Pond is a 63 y.o. female presenting for    Spot check - last visit had likely inflamed SK on mid chest area and right forearm.  Patient is not sure if these spots have gotten better  Has several other brown spots on the arms/chest, wants to ensure they are OK  No itching/bleeding/pain    History of skin cancer: No  History of precancers/actinic keratoses: Yes, in 1990's  History of biopsies:Yes, Details: SK on back  History of blistering/severe sunburns:Yes, sunburns  Family history of skin cancer:No  Family history of atypical moles:No        Past Medical History:   Diagnosis Date   • Glaucoma        Current Outpatient Prescriptions on File Prior to Visit   Medication Sig Dispense Refill   • triamcinolone acetonide (KENALOG) 0.1 % Cream Apply a thin layer to affected area BID x 10 days 30 g 1   • clotrimazole (LOTRIMIN) 1 % Cream Apply a thin layer to affected area on feet BID until rash gone, then two days later. Don't use longer than 14 days. 30 g 1   • latanoprost (XALATAN) 0.005 % Solution Place 1 Drop in both eyes every evening.     • acyclovir (ZOVIRAX) 400 MG tablet Take 1 Tab by mouth 3 times a day. 60 Tab 1     No current facility-administered medications on file prior to visit.        Allergies   Allergen Reactions   • Latex    • Tetanus Toxoid        Family History   Problem Relation Age of Onset   • Colon Cancer Mother    • Heart Attack Father        Social History     Social History   • Marital status: Single     Spouse name: N/A   • Number of children: N/A   • Years of education: N/A     Occupational History   • Not on file.     Social History Main Topics   • Smoking status: Never Smoker   • Smokeless tobacco: Never Used   • Alcohol use 1.2 oz/week     2 Glasses of wine per week   • Drug use: No   • Sexual activity: Yes     Other Topics Concern   • Caffeine Concern No   •  Sleep Concern No   • Weight Concern Yes   • Special Diet No   • Exercise Yes     Social History Narrative   • No narrative on file       Review of Systems   Constitutional: Negative for chills and fever.   Skin: Negative for itching and rash.   All other systems reviewed and are negative.       Objective:     A focused cutaneous exam was completed including: face, eyelids, conjunctiva, lips, neck, upper chest left and right upper extremities (including hands/digits and fingernails), left and right thighs with the following pertinent findings listed below. Remaining above-listed examined areas within normal limits / negative for rashes or lesions.    Physical Exam   Constitutional: She is oriented to person, place, and time and well-developed, well-nourished, and in no distress.   HENT:   Head: Normocephalic and atraumatic.   Eyes: Conjunctivae and lids are normal.   Neck: Normal range of motion.   Pulmonary/Chest: Effort normal.   Neurological: She is alert and oriented to person, place, and time.   Skin: Skin is warm and dry.        Psychiatric: Mood and affect normal.       DATA: none applicable to review    Assessment and Plan:     1. Seborrheic keratoses  - Benign-appearing nature of lesions discussed. Advised to return to clinic for any new or concerning   - ABCDE's of melanoma discussed  - discussed importance of regular sun protection/sunscreen use, SPF 30 or greater with broad spectrum coverage, need for reapplication every  minutes    Followup: Return in about 1 year (around 10/17/2019).    Sarah Posada M.D.

## 2018-10-18 NOTE — PROGRESS NOTES
"[facility]  Progress Note  Established Patient    Subjective:   Loli Pond is a 63 y.o. female here today with a chief complaint of depression. The patient is alone.     Depression  The patient describes some depression. It is predominately associated with some relationship troubles. She has seen a counselor and has this available to her if she will require in the future. She states that her partner is not interested in pursuing relationship counseling. She does not believe the medication would be helpful.      Current Outpatient Prescriptions on File Prior to Visit   Medication Sig Dispense Refill   • latanoprost (XALATAN) 0.005 % Solution Place 1 Drop in both eyes every evening.     • acyclovir (ZOVIRAX) 400 MG tablet Take 1 Tab by mouth 3 times a day. 60 Tab 1   • triamcinolone acetonide (KENALOG) 0.1 % Cream Apply a thin layer to affected area BID x 10 days 30 g 1   • clotrimazole (LOTRIMIN) 1 % Cream Apply a thin layer to affected area on feet BID until rash gone, then two days later. Don't use longer than 14 days. 30 g 1     No current facility-administered medications on file prior to visit.        Past Medical History:   Diagnosis Date   • Glaucoma        Allergies: Latex and Tetanus toxoid    Surgical History:  has a past surgical history that includes primary c section.    Family History: family history includes Colon Cancer in her mother; Heart Attack in her father.    Social History:  reports that she has never smoked. She has never used smokeless tobacco. She reports that she drinks about 1.2 oz of alcohol per week . She reports that she does not use drugs.    ROS: no fever.        Objective:     Vitals:    10/17/18 1603   BP: 102/60   BP Location: Right arm   Patient Position: Sitting   BP Cuff Size: Adult   Pulse: 73   Resp: 18   Temp: 36.4 °C (97.5 °F)   TempSrc: Temporal   SpO2: 96%   Weight: 78 kg (172 lb)   Height: 1.791 m (5' 10.5\")       Physical Exam:  General: alert in no " apparent distress.   Affect: normal.         Assessment and Plan:     1. Depression, unspecified depression type  - support provided.   - encouraged counseling.         Followup: Return in about 1 year (around 10/17/2019), or if symptoms worsen or fail to improve, for Wellness Visit, Long.

## 2018-10-18 NOTE — ASSESSMENT & PLAN NOTE
The patient describes some depression. It is predominately associated with some relationship troubles. She has seen a counselor and has this available to her if she will require in the future. She states that her partner is not interested in pursuing relationship counseling. She does not believe the medication would be helpful.

## 2019-05-01 ENCOUNTER — EH NON-PROVIDER (OUTPATIENT)
Dept: OCCUPATIONAL MEDICINE | Facility: CLINIC | Age: 64
End: 2019-05-01

## 2019-05-01 ENCOUNTER — HOSPITAL ENCOUNTER (OUTPATIENT)
Facility: MEDICAL CENTER | Age: 64
End: 2019-05-01
Attending: PREVENTIVE MEDICINE
Payer: COMMERCIAL

## 2019-05-01 DIAGNOSIS — Z02.89 VISIT FOR OCCUPATIONAL HEALTH EXAMINATION: Primary | ICD-10-CM

## 2019-05-01 DIAGNOSIS — Z02.89 VISIT FOR OCCUPATIONAL HEALTH EXAMINATION: ICD-10-CM

## 2019-05-01 LAB
MEV IGG SER IA-ACNC: 1.87
MUV IGG SER IA-ACNC: 4.28
RUBV AB SER QL: <0.2 IU/ML
VZV IGG SER IA-ACNC: 1.93

## 2019-05-01 PROCEDURE — 86765 RUBEOLA ANTIBODY: CPT | Performed by: PREVENTIVE MEDICINE

## 2019-05-01 PROCEDURE — 86480 TB TEST CELL IMMUN MEASURE: CPT | Performed by: PREVENTIVE MEDICINE

## 2019-05-01 PROCEDURE — 86787 VARICELLA-ZOSTER ANTIBODY: CPT | Performed by: PREVENTIVE MEDICINE

## 2019-05-01 PROCEDURE — 86762 RUBELLA ANTIBODY: CPT | Performed by: PREVENTIVE MEDICINE

## 2019-05-01 PROCEDURE — 86735 MUMPS ANTIBODY: CPT | Performed by: PREVENTIVE MEDICINE

## 2019-05-01 NOTE — NON-PROVIDER
MMR/VZV titers were drawn. Along with the TB draw  Hep B/Flu were declined.  Patient is getting a letter from her PCP stating that she is allergic to the Tdap. It was made clear to her that if the letter was not given to us w/in a week she would not be cleared.

## 2019-05-03 LAB
GAMMA INTERFERON BACKGROUND BLD IA-ACNC: 0.05 IU/ML
M TB IFN-G BLD-IMP: POSITIVE
M TB IFN-G CD4+ BCKGRND COR BLD-ACNC: 0.47 IU/ML
MITOGEN IGNF BCKGRD COR BLD-ACNC: >10 IU/ML
QFT TB2 - NIL TBQ2: 0.51 IU/ML

## 2019-05-07 ENCOUNTER — TELEPHONE (OUTPATIENT)
Dept: OCCUPATIONAL MEDICINE | Facility: CLINIC | Age: 64
End: 2019-05-07

## 2019-05-07 ENCOUNTER — OFFICE VISIT (OUTPATIENT)
Dept: MEDICAL GROUP | Facility: MEDICAL CENTER | Age: 64
End: 2019-05-07
Payer: COMMERCIAL

## 2019-05-07 VITALS
RESPIRATION RATE: 18 BRPM | DIASTOLIC BLOOD PRESSURE: 60 MMHG | WEIGHT: 163.4 LBS | SYSTOLIC BLOOD PRESSURE: 100 MMHG | TEMPERATURE: 98.6 F | HEART RATE: 85 BPM | HEIGHT: 71 IN | OXYGEN SATURATION: 98 % | BODY MASS INDEX: 22.88 KG/M2

## 2019-05-07 DIAGNOSIS — K64.9 HEMORRHOIDS, UNSPECIFIED HEMORRHOID TYPE: ICD-10-CM

## 2019-05-07 DIAGNOSIS — R76.12 POSITIVE QUANTIFERON-TB GOLD TEST: ICD-10-CM

## 2019-05-07 PROCEDURE — 99214 OFFICE O/P EST MOD 30 MIN: CPT | Performed by: FAMILY MEDICINE

## 2019-05-07 RX ORDER — ACYCLOVIR 400 MG/1
400 TABLET ORAL EVERY 8 HOURS PRN
COMMUNITY
Start: 2019-05-07 | End: 2020-03-17

## 2019-05-07 NOTE — ASSESSMENT & PLAN NOTE
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  It showed a weakly positive QuantiFERON gold test.  The patient denies known exposure to TB, cough, chest pain or shortness of breath.

## 2019-05-07 NOTE — TELEPHONE ENCOUNTER
Phone Number Called: [phone] (home)       Message: left vm to call back, pt needs mmr and quantiferon     Left Message for patient to call back: yes

## 2019-05-07 NOTE — ASSESSMENT & PLAN NOTE
"Patient has a history of hemorrhoids.  She states it has recurred.  Specifically, she describes a \"grape-like lump\" in her rectum.  It is not bleeding nor is it painful.  She has been lifting heavy boxes at her job and thinks this might have contributed to its development.  She had a colonoscopy done in 2017 demonstrating internal hemorrhoids.  She is very reluctant to undergo examination today.  Preparation H is not helping.  She is looking for alternative treatments.   "

## 2019-05-07 NOTE — PROGRESS NOTES
"[facility]  Progress Note  Established Patient    Subjective:   Loli Pond is a 63 y.o. female here today with a chief complaint of hemorrhoids. The patient is alone.     Positive QuantiFERON-TB Gold test  The patient recently had some blood work done because she would like to start volunteering at the hospital.  It showed a weakly positive QuantiFERON gold test.  The patient denies known exposure to TB, cough, chest pain or shortness of breath.    Hemorrhoids  Patient has a history of hemorrhoids.  She states it has recurred.  Specifically, she describes a \"grape-like lump\" in her rectum.  It is not bleeding nor is it painful.  She has been lifting heavy boxes at her job and thinks this might have contributed to its development.  She had a colonoscopy done in 2017 demonstrating internal hemorrhoids.  She is very reluctant to undergo examination today.  Preparation H is not helping.  She is looking for alternative treatments.       Current Outpatient Prescriptions on File Prior to Visit   Medication Sig Dispense Refill   • latanoprost (XALATAN) 0.005 % Solution Place 1 Drop in both eyes every evening.     • triamcinolone acetonide (KENALOG) 0.1 % Cream Apply a thin layer to affected area BID x 10 days 30 g 1   • clotrimazole (LOTRIMIN) 1 % Cream Apply a thin layer to affected area on feet BID until rash gone, then two days later. Don't use longer than 14 days. 30 g 1     No current facility-administered medications on file prior to visit.        Past Medical History:   Diagnosis Date   • Glaucoma        Allergies: Latex and Tetanus toxoid    Surgical History:  has a past surgical history that includes primary c section.    Family History: family history includes Colon Cancer in her mother; Heart Attack in her father.    Social History:  reports that she has never smoked. She has never used smokeless tobacco. She reports that she drinks about 1.2 oz of alcohol per week . She reports that she does not " "use drugs.    ROS: no fever.        Objective:     Vitals:    05/07/19 0857   BP: 100/60   BP Location: Left arm   Patient Position: Sitting   BP Cuff Size: Adult   Pulse: 85   Resp: 18   Temp: 37 °C (98.6 °F)   TempSrc: Temporal   SpO2: 98%   Weight: 74.1 kg (163 lb 6.4 oz)   Height: 1.791 m (5' 10.5\")       Physical Exam:  General: alert in no apparent distress.   Cardio: regular rate and rhythm, no murmurs, rubs or gallops.   Resp: CTAB no w/r/r.   Rectal: patient reluctant to undergo exam, deferred.         Assessment and Plan:     1. Positive QuantiFERON-TB Gold test  The patient is very skeptical about this finding, is asymptomatic, and is very reluctant to undergo 9 months of isoniazid.  I discussed this case with Rosa Maria, one of the TB nurses, who notes that considering the patient's low numbers, it would be reasonable to get a chest x-ray and repeat quantiferon gold test in 1 month.  If these are both normal, no further treatment should be required.  I certainly appreciate Rosa Maria's assistance here.  Reporting form sent to health department.    2. Hemorrhoids, unspecified hemorrhoid type  - recommended fluid, fiber, metamucil and 7 days of colace. Return if no resolution.     Total 27 minutes face-to-face time spent with patient, with greater than 50% of the total time discussing patient's issues and symptoms as listed above in assessment and plan, as well as managing coordination of care for future evaluation and treatment including reporting the quantiferon gold finding to the health department and speaking to the public health nurse.      Followup: Return in about 1 week (around 5/14/2019), or if symptoms worsen or fail to improve.         "

## 2019-05-14 ENCOUNTER — HOSPITAL ENCOUNTER (OUTPATIENT)
Facility: MEDICAL CENTER | Age: 64
End: 2019-05-14
Attending: PREVENTIVE MEDICINE
Payer: COMMERCIAL

## 2019-05-14 ENCOUNTER — EH NON-PROVIDER (OUTPATIENT)
Dept: OCCUPATIONAL MEDICINE | Facility: CLINIC | Age: 64
End: 2019-05-14

## 2019-05-14 DIAGNOSIS — Z02.89 VISIT FOR OCCUPATIONAL HEALTH EXAMINATION: ICD-10-CM

## 2019-05-14 PROCEDURE — 86480 TB TEST CELL IMMUN MEASURE: CPT | Performed by: PREVENTIVE MEDICINE

## 2019-05-15 ENCOUNTER — APPOINTMENT (OUTPATIENT)
Dept: MEDICAL GROUP | Facility: MEDICAL CENTER | Age: 64
End: 2019-05-15
Payer: COMMERCIAL

## 2019-05-15 ENCOUNTER — TELEPHONE (OUTPATIENT)
Dept: MEDICAL GROUP | Facility: MEDICAL CENTER | Age: 64
End: 2019-05-15

## 2019-05-15 DIAGNOSIS — R76.12 POSITIVE QUANTIFERON-TB GOLD TEST: ICD-10-CM

## 2019-05-15 LAB — AMBIGUOUS DTTM AMBI4: NORMAL

## 2019-05-15 NOTE — PROGRESS NOTES
Patient late for appt. Had to reschedule but in the meantime she needs CXR for pos Quantiferon Gold. This was ordered.

## 2019-05-18 LAB
GAMMA INTERFERON BACKGROUND BLD IA-ACNC: 0.04 IU/ML
M TB IFN-G BLD-IMP: NEGATIVE
M TB IFN-G CD4+ BCKGRND COR BLD-ACNC: 0.17 IU/ML
MITOGEN IGNF BCKGRD COR BLD-ACNC: >10 IU/ML
QFT TB2 - NIL TBQ2: 0.11 IU/ML

## 2019-05-20 ENCOUNTER — TELEPHONE (OUTPATIENT)
Dept: OCCUPATIONAL MEDICINE | Facility: CLINIC | Age: 64
End: 2019-05-20

## 2019-05-20 ENCOUNTER — EH NON-PROVIDER (OUTPATIENT)
Dept: OCCUPATIONAL MEDICINE | Facility: CLINIC | Age: 64
End: 2019-05-20

## 2019-05-20 DIAGNOSIS — Z23 NEED FOR VACCINATION: Primary | ICD-10-CM

## 2019-05-20 PROCEDURE — 90715 TDAP VACCINE 7 YRS/> IM: CPT | Performed by: PREVENTIVE MEDICINE

## 2019-05-20 PROCEDURE — 90707 MMR VACCINE SC: CPT | Performed by: PREVENTIVE MEDICINE

## 2019-05-20 NOTE — TELEPHONE ENCOUNTER
PT is immune to Rubeola and Mumps and non-immune to Rubella. Pt's birth is before 1956, therefore the recommendation is 1 dose of MMR for those HCP with no laboratory evidence to Rubella. Discussed with Dr. Smith. Recommendation provided by [email or web address].

## 2019-05-22 ENCOUNTER — EH NON-PROVIDER (OUTPATIENT)
Dept: OCCUPATIONAL MEDICINE | Facility: CLINIC | Age: 64
End: 2019-05-22

## 2019-05-22 DIAGNOSIS — Z71.85 IMMUNIZATION COUNSELING: ICD-10-CM

## 2019-05-23 ENCOUNTER — OFFICE VISIT (OUTPATIENT)
Dept: MEDICAL GROUP | Facility: MEDICAL CENTER | Age: 64
End: 2019-05-23
Payer: COMMERCIAL

## 2019-05-23 VITALS
DIASTOLIC BLOOD PRESSURE: 62 MMHG | HEART RATE: 81 BPM | WEIGHT: 161 LBS | RESPIRATION RATE: 16 BRPM | HEIGHT: 71 IN | OXYGEN SATURATION: 97 % | SYSTOLIC BLOOD PRESSURE: 110 MMHG | TEMPERATURE: 98.4 F | BODY MASS INDEX: 22.54 KG/M2

## 2019-05-23 DIAGNOSIS — K64.9 HEMORRHOIDS, UNSPECIFIED HEMORRHOID TYPE: ICD-10-CM

## 2019-05-23 DIAGNOSIS — R76.12 POSITIVE QUANTIFERON-TB GOLD TEST: ICD-10-CM

## 2019-05-23 DIAGNOSIS — Z00.00 HEALTHCARE MAINTENANCE: ICD-10-CM

## 2019-05-23 DIAGNOSIS — M79.642 BILATERAL HAND PAIN: ICD-10-CM

## 2019-05-23 DIAGNOSIS — M79.641 BILATERAL HAND PAIN: ICD-10-CM

## 2019-05-23 PROCEDURE — 99396 PREV VISIT EST AGE 40-64: CPT | Performed by: FAMILY MEDICINE

## 2019-05-23 NOTE — ASSESSMENT & PLAN NOTE
Patient describes several weeks of worsening bilateral hand pain, most notable at the base of the bilateral thumbs in the bilateral index finger CMC joint.  Is no associated trauma.  Certain activities like turning a doorknob tend to worsen the pain.

## 2019-05-23 NOTE — ASSESSMENT & PLAN NOTE
"Lipids: done 2018 and normal.   Fasting Glucose: done 2018 and normal.   Hepatitis C Screen: done 2018 and normal.   Colonoscopy: done 4/19/17 with 5 year recall, see \"media\".  Mammogram: done 5/2018 with BiRADS2.   Pap: done 2016 with cotesting and normal.    Tdap: done 2018.   Shingrix: recommended, patient declines.   "

## 2019-05-23 NOTE — PROGRESS NOTES
"[facility]  Progress Note  Established Patient    Subjective:   Loli Pond is a 63 y.o. female here today for a wellness exam. The patient is alone.     Healthcare maintenance  Lipids: done 2018 and normal.   Fasting Glucose: done 2018 and normal.   Hepatitis C Screen: done 2018 and normal.   Colonoscopy: done 4/19/17 with 5 year recall, see \"media\".  Mammogram: done 5/2018 with BiRADS2.   Pap: done 2016 with cotesting and normal.    Tdap: done 2018.   Shingrix: recommended, patient declines.     Bilateral hand pain  Patient describes several weeks of worsening bilateral hand pain, most notable at the base of the bilateral thumbs in the bilateral index finger CMC joint.  Is no associated trauma.  Certain activities like turning a doorknob tend to worsen the pain.    Hemorrhoids  At the last visit the patient described a \"grape-like lump\" in her rectum that was not bleeding or painful.  She had a colonoscopy done in 2017 demonstrating internal hemorrhoids.  Due to her reluctance to undergo examination, I treated her empirically for a hemorrhoid with some dietary modification, however, the patient states that her symptoms have not improved whatsoever.    Positive QuantiFERON-TB Gold test  Patient repeated this test and it was negative.      Current Outpatient Prescriptions on File Prior to Visit   Medication Sig Dispense Refill   • acyclovir (ZOVIRAX) 400 MG tablet Take 400 mg by mouth every 8 hours as needed.     • triamcinolone acetonide (KENALOG) 0.1 % Cream Apply a thin layer to affected area BID x 10 days 30 g 1   • clotrimazole (LOTRIMIN) 1 % Cream Apply a thin layer to affected area on feet BID until rash gone, then two days later. Don't use longer than 14 days. 30 g 1   • latanoprost (XALATAN) 0.005 % Solution Place 1 Drop in both eyes every evening.       No current facility-administered medications on file prior to visit.        Past Medical History:   Diagnosis Date   • Glaucoma  " "      Allergies: Latex and Tetanus toxoid    Surgical History:  has a past surgical history that includes primary c section.    Family History: family history includes Colon Cancer in her mother; Heart Attack in her father.    Social History:  reports that she has never smoked. She has never used smokeless tobacco. She reports that she drinks about 1.2 oz of alcohol per week . She reports that she does not use drugs.    ROS: no fever.        Objective:     Vitals:    05/23/19 1342   BP: 110/62   BP Location: Left arm   Patient Position: Sitting   BP Cuff Size: Adult   Pulse: 81   Resp: 16   Temp: 36.9 °C (98.4 °F)   TempSrc: Temporal   SpO2: 97%   Weight: 73 kg (161 lb)   Height: 1.791 m (5' 10.5\")       Physical Exam:  General: alert in no apparent distress.   Cardio: regular rate and rhythm, no murmurs, rubs or gallops.   Resp: CTAB no w/r/r.   MSK: Bilateral hands are normal with negative Tinel sign negative Finkelstein testing and negative thumb opposition testing.  There is no redness, swelling, induration or warmth in the bilateral hands.  Full range of motion.        Assessment and Plan:     1. Healthcare maintenance  - see HPI.   - discussed diet and exercise.   - REFERRAL TO GYNECOLOGY for pap.   - Lipid Profile; Future  - Blood Glucose; Future    2. Hemorrhoids, unspecified hemorrhoid type  The patient's \"grapelike lump\" is likely due to hemorrhoid, however, I informed her that at this stage I would be concerned about a tumor.  She has been reluctant to undergo examination and so I will refer her to gastroenterology.  If it is a tumor, they will be able to treat.  If it is a hemorrhoid, they will be able to band.  - REFERRAL TO GASTROENTEROLOGY    3. 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  This was a false positive.     4. Bilateral hand pain  Exam normal.  This is most consistent with osteoarthritis.  I will treat the patient with Voltaren gel.  She will return with any new or worsening symptoms.  " At that point, I would pursue imaging.   - Diclofenac Sodium 1 % Gel; Apply 2 g to joint QID PRN hand pain  Dispense: 1 Tube; Refill: 1        Followup: Return in about 1 year (around 5/23/2020), or if symptoms worsen or fail to improve, for Wellness Visit, Long.

## 2019-05-23 NOTE — ASSESSMENT & PLAN NOTE
"At the last visit the patient described a \"grape-like lump\" in her rectum that was not bleeding or painful.  She had a colonoscopy done in 2017 demonstrating internal hemorrhoids.  Due to her reluctance to undergo examination, I treated her empirically for a hemorrhoid with some dietary modification, however, the patient states that her symptoms have not improved whatsoever.  "

## 2019-06-12 ENCOUNTER — OFFICE VISIT (OUTPATIENT)
Dept: MEDICAL GROUP | Facility: MEDICAL CENTER | Age: 64
End: 2019-06-12
Payer: COMMERCIAL

## 2019-06-12 VITALS
HEART RATE: 74 BPM | HEIGHT: 71 IN | BODY MASS INDEX: 22.71 KG/M2 | SYSTOLIC BLOOD PRESSURE: 108 MMHG | RESPIRATION RATE: 12 BRPM | DIASTOLIC BLOOD PRESSURE: 78 MMHG | OXYGEN SATURATION: 95 % | TEMPERATURE: 98.3 F | WEIGHT: 162.2 LBS

## 2019-06-12 DIAGNOSIS — M79.641 BILATERAL HAND PAIN: ICD-10-CM

## 2019-06-12 DIAGNOSIS — M79.642 BILATERAL HAND PAIN: ICD-10-CM

## 2019-06-12 PROCEDURE — 99214 OFFICE O/P EST MOD 30 MIN: CPT | Performed by: FAMILY MEDICINE

## 2019-06-12 RX ORDER — NAPROXEN 375 MG/1
375 TABLET ORAL 2 TIMES DAILY WITH MEALS
Qty: 10 TAB | Refills: 0 | Status: SHIPPED | OUTPATIENT
Start: 2019-06-12 | End: 2019-06-17

## 2019-06-12 NOTE — PROGRESS NOTES
[facility]  Progress Note  Established Patient    Subjective:   Loli Pond is a 63 y.o. female here today with a chief complaint of hand pain. The patient is alone.     Bilateral hand pain  I saw the patient about 3 weeks ago where she was describing several weeks of worsening bilateral hand pain, most notable at the base of the bilateral thumbs and the bilateral index finger CMC joint.  This sounded like osteoarthritis and I recommended Voltaren gel.  Patient states that this helped her pain but as soon as the medication would wear off, her pain would come back with a vengeance.  She states that over the past week her symptoms have actually worsened.  She is describing bilateral generalized hand pain, worse at the base of the thumb and DIP joints.  She is also describing tingling and shooting pains in her hand and lateral wrist pain.  There is no trauma but she works at a [employer] facility where she is lifting heavy boxes a lot.  She is asking me about the Worker's Compensation process.  Pain is moderate to severe in severity.      Current Outpatient Prescriptions on File Prior to Visit   Medication Sig Dispense Refill   • acyclovir (ZOVIRAX) 400 MG tablet Take 400 mg by mouth every 8 hours as needed.     • latanoprost (XALATAN) 0.005 % Solution Place 1 Drop in both eyes every evening.     • triamcinolone acetonide (KENALOG) 0.1 % Cream Apply a thin layer to affected area BID x 10 days 30 g 1   • clotrimazole (LOTRIMIN) 1 % Cream Apply a thin layer to affected area on feet BID until rash gone, then two days later. Don't use longer than 14 days. 30 g 1     No current facility-administered medications on file prior to visit.        Past Medical History:   Diagnosis Date   • Glaucoma        Allergies: Latex and Tetanus toxoid    Surgical History:  has a past surgical history that includes primary c section.    Family History: family history includes Colon Cancer in her mother; Heart Attack in her  "father.    Social History:  reports that she has never smoked. She has never used smokeless tobacco. She reports that she drinks about 1.2 oz of alcohol per week . She reports that she does not use drugs.    ROS: no fever or nausea.        Objective:     Vitals:    06/12/19 0935   BP: 108/78   BP Location: Left arm   Patient Position: Sitting   BP Cuff Size: Adult   Pulse: 74   Resp: 12   Temp: 36.8 °C (98.3 °F)   SpO2: 95%   Weight: 73.6 kg (162 lb 3.2 oz)   Height: 1.791 m (5' 10.5\")       Physical Exam:  General: alert in no apparent distress.   Cardio: 2+ radial pulses bilaterally.  Neuro: Sensation intact in bilateral hands.  Muscular skeletal: Positive Finkelstein testing in the bilateral hands.  Positive Tinel sign of the bilateral wrist.  Positive Phalen sign of the bilateral wrist.         Assessment and Plan:     1. Bilateral hand pain  Patient continues to have bilateral hand pain with a number of new problems that she presents to me today.  Altogether, I believe that she has osteoarthritis complicated by de Quervain's tenosynovitis and carpal tunnel syndrome.  I recommended a carpal tunnel wrist splint at night and a thumb spica splint during the day.  I will put her on 5 days of naproxen.  I also recommended that she stay off of work until next week and provided a work note.  I informed her that we do not do Worker's Compensation in the primary care setting so I don't now much about it and so she should discuss this with her employer.  I will refer her to sports medicine and informed her that if her symptoms do not resolve with our treatment plan, she should call for an appointment to see them.  - DX-JOINT SURVEY-HANDS SINGLE VIEW; Future  - naproxen (NAPROSYN) 375 MG Tab; Take 1 Tab by mouth 2 times a day, with meals for 5 days.  Dispense: 10 Tab; Refill: 0  - REFERRAL TO SPORTS MEDICINE        Followup: Return if symptoms worsen or fail to improve.         "

## 2019-06-12 NOTE — ASSESSMENT & PLAN NOTE
I saw the patient about 3 weeks ago where she was describing several weeks of worsening bilateral hand pain, most notable at the base of the bilateral thumbs and the bilateral index finger CMC joint.  This sounded like osteoarthritis and I recommended Voltaren gel.  Patient states that this helped her pain but as soon as the medication would wear off, her pain would come back with a vengeance.  She states that over the past week her symptoms have actually worsened.  She is describing bilateral generalized hand pain, worse at the base of the thumb and DIP joints.  She is also describing tingling and shooting pains in her hand and lateral wrist pain.  There is no trauma but she works at a [employer] facility where she is lifting heavy boxes a lot.  She is asking me about the Worker's Compensation process.  Pain is moderate to severe in severity.

## 2019-06-12 NOTE — LETTER
June 12, 2019    To Whom It May Concern:         This is confirmation that Loli Pond attended her scheduled appointment with Greg Dozier M.D. on 6/12/19.         Please excuse her from work from 6/12 through 6/16/19.     Sincerely,          Greg Dozier M.D.  [phone]

## 2019-06-19 ENCOUNTER — HOSPITAL ENCOUNTER (OUTPATIENT)
Dept: RADIOLOGY | Facility: MEDICAL CENTER | Age: 64
End: 2019-06-19
Attending: FAMILY MEDICINE
Payer: COMMERCIAL

## 2019-06-19 DIAGNOSIS — M79.641 BILATERAL HAND PAIN: ICD-10-CM

## 2019-06-19 DIAGNOSIS — M79.642 BILATERAL HAND PAIN: ICD-10-CM

## 2019-06-19 PROCEDURE — 77077 JOINT SURVEY SINGLE VIEW: CPT

## 2019-06-26 ENCOUNTER — OFFICE VISIT (OUTPATIENT)
Dept: MEDICAL GROUP | Facility: CLINIC | Age: 64
End: 2019-06-26
Payer: COMMERCIAL

## 2019-06-26 VITALS
RESPIRATION RATE: 14 BRPM | SYSTOLIC BLOOD PRESSURE: 108 MMHG | HEIGHT: 71 IN | OXYGEN SATURATION: 98 % | WEIGHT: 162.26 LBS | DIASTOLIC BLOOD PRESSURE: 70 MMHG | TEMPERATURE: 98.4 F | BODY MASS INDEX: 22.72 KG/M2 | HEART RATE: 88 BPM

## 2019-06-26 DIAGNOSIS — M19.041 PRIMARY OSTEOARTHRITIS OF BOTH HANDS: ICD-10-CM

## 2019-06-26 DIAGNOSIS — M19.042 PRIMARY OSTEOARTHRITIS OF BOTH HANDS: ICD-10-CM

## 2019-06-26 DIAGNOSIS — G56.03 CARPAL TUNNEL SYNDROME, BILATERAL: ICD-10-CM

## 2019-06-26 DIAGNOSIS — M65.4 DE QUERVAIN'S TENOSYNOVITIS, BILATERAL: ICD-10-CM

## 2019-06-26 PROCEDURE — 99203 OFFICE O/P NEW LOW 30 MIN: CPT | Performed by: FAMILY MEDICINE

## 2019-06-26 ASSESSMENT — ENCOUNTER SYMPTOMS
VOMITING: 0
FEVER: 0
SHORTNESS OF BREATH: 0
TINGLING: 1
SENSORY CHANGE: 1

## 2019-06-26 NOTE — PROGRESS NOTES
Subjective:     Loli Pond is a 63 y.o. female who presents for Hand Pain (C/O bilateral hand, 2nd-4th digits tingling/painful sensation/tingling. Pain radiates from wrists down to fingers. Has tenderness in wrist. Onset 1-2 months.)    HPI  Pt presents for evaluation of bilateral hand pain   Pt has been working at [employer] for the past 32 months   Working with her hands more at this job, and has started experiencing more hand pain the past few weeks   Pain is worst at the base of bilateral thumbs  Pain is worsening and starting to wake her up at night   Waking up with numbness at times   Numbness is worse in digits 2-4  Pain is exacerbated by wrist twisting and trying to open jars   Has quit working at [employer] and now only working as a      Review of Systems   Constitutional: Negative for fever.   Respiratory: Negative for shortness of breath.    Cardiovascular: Negative for chest pain.   Gastrointestinal: Negative for vomiting.   Skin: Negative for rash.   Neurological: Positive for tingling and sensory change.     PMH:  has a past medical history of Glaucoma.  MEDS:   Current Outpatient Prescriptions:   •  acyclovir (ZOVIRAX) 400 MG tablet, Take 400 mg by mouth every 8 hours as needed., Disp: , Rfl:   •  triamcinolone acetonide (KENALOG) 0.1 % Cream, Apply a thin layer to affected area BID x 10 days, Disp: 30 g, Rfl: 1  •  clotrimazole (LOTRIMIN) 1 % Cream, Apply a thin layer to affected area on feet BID until rash gone, then two days later. Don't use longer than 14 days., Disp: 30 g, Rfl: 1  •  latanoprost (XALATAN) 0.005 % Solution, Place 1 Drop in both eyes every evening., Disp: , Rfl:   ALLERGIES:   Allergies   Allergen Reactions   • Latex    • Tetanus Toxoid      SURGHX:   Past Surgical History:   Procedure Laterality Date   • PRIMARY C SECTION       SOCHX:  reports that she has never smoked. She has never used smokeless tobacco. She reports that she drinks about 1.2 oz of alcohol per  "week . She reports that she does not use drugs.  FH: Family history was reviewed, not contributing to acute hand pain      Objective:   /70 (BP Location: Left arm, Patient Position: Sitting, BP Cuff Size: Adult)   Pulse 88   Temp 36.9 °C (98.4 °F) (Temporal)   Resp 14   Ht 1.791 m (5' 10.5\")   Wt 73.6 kg (162 lb 4.1 oz)   SpO2 98%   BMI 22.95 kg/m²     Physical Exam   Constitutional: She is oriented to person, place, and time. She appears well-developed and well-nourished. No distress.   HENT:   Head: Normocephalic and atraumatic.   Musculoskeletal:   Bilateral hands  General: no gross deformity, ecchymosis, or erythema  Palpation: TTP along extensor pollicis longus and brevis bilaterally  ROM: FROM throughout   Strength: 5/5 flexion, 5/5 extension, 5/5   Special testing: +Tinel's, +Phalen's, +Finkelstein's  Neuro: median, radial, ulnar, and nerve motor function intact, sensation intact  Vascular: radial, ulnar pulses 2+ and symmetric, cap refill <2 sec   Neurological: She is alert and oriented to person, place, and time.   Skin: Skin is warm and dry. She is not diaphoretic. No erythema.   Psychiatric: She has a normal mood and affect. Her behavior is normal. Judgment and thought content normal.     Assessment/Plan:   Assessment    1. De Quervain's tenosynovitis, bilateral  2. Carpal tunnel syndrome, bilateral  3. Primary osteoarthritis of both hands  Patient is a 63-year-old female with de Quervain's tenosynovitis, carpal tunnel syndrome, and primary osteoarthritis of both hands.  Her most painful pathology appears to be de Quervain's tenosynovitis.  Also being woken up with carpal tunnel only.  Advised that conservative management with thumb spica splints is the first treatment of choice.  Patient must wear these wrist braces all night in her sleep and also majority of her day when working with her hands.  Instructed to form gentle range of motion exercises throughout the day.  Patient was " provided braces in office today.  Will follow-up in 1 month to monitor her progress.  If not making significant improvements, may consider local corticosteroid injections either into carpal tunnel or along tendon sheath for de Quervain's depending on what is hurting the most at that time.

## 2019-07-31 ENCOUNTER — OFFICE VISIT (OUTPATIENT)
Dept: MEDICAL GROUP | Facility: CLINIC | Age: 64
End: 2019-07-31
Payer: COMMERCIAL

## 2019-07-31 VITALS
HEART RATE: 86 BPM | DIASTOLIC BLOOD PRESSURE: 72 MMHG | HEIGHT: 71 IN | SYSTOLIC BLOOD PRESSURE: 110 MMHG | WEIGHT: 162.26 LBS | RESPIRATION RATE: 12 BRPM | OXYGEN SATURATION: 96 % | TEMPERATURE: 98.4 F | BODY MASS INDEX: 22.72 KG/M2

## 2019-07-31 DIAGNOSIS — G56.03 CARPAL TUNNEL SYNDROME, BILATERAL: ICD-10-CM

## 2019-07-31 DIAGNOSIS — M65.4 DE QUERVAIN'S TENOSYNOVITIS, BILATERAL: ICD-10-CM

## 2019-07-31 PROCEDURE — 99213 OFFICE O/P EST LOW 20 MIN: CPT | Performed by: FAMILY MEDICINE

## 2019-07-31 ASSESSMENT — ENCOUNTER SYMPTOMS
SHORTNESS OF BREATH: 0
FEVER: 0
TINGLING: 1
VOMITING: 0

## 2019-08-01 NOTE — PROGRESS NOTES
Subjective:     Loli Pond is a 64 y.o. female who presents for Wrist Pain (Improving with pain in the bilateral wrists, not waking up in the middle of the night due to pain. If uses hands more than usual, there will be some some-dull achy sensation. Doing exercises and light weights. )    HPI  Pt presents for follow-up of bilateral wrist pain  Patient previously seen for bilateral carpal tunnel and de Quervain's tenosynovitis  She was placed into bilateral thumb spica splints and feels that they helped greatly  Only needed splints for about a week and has not been needing them lately  Patient is not nearly pain-free currently without use of brace  Still having occasional numbness in the middle of the night, however not persistent like it used to be and it is much less frequent  Pain in the thumbs has greatly improved  Overall happy with her progress    Review of Systems   Constitutional: Negative for fever.   Respiratory: Negative for shortness of breath.    Cardiovascular: Negative for chest pain.   Gastrointestinal: Negative for vomiting.   Skin: Negative for rash.   Neurological: Positive for tingling.     PMH:  has a past medical history of Glaucoma.  MEDS:   Current Outpatient Medications:   •  acyclovir (ZOVIRAX) 400 MG tablet, Take 400 mg by mouth every 8 hours as needed., Disp: , Rfl:   •  triamcinolone acetonide (KENALOG) 0.1 % Cream, Apply a thin layer to affected area BID x 10 days, Disp: 30 g, Rfl: 1  •  clotrimazole (LOTRIMIN) 1 % Cream, Apply a thin layer to affected area on feet BID until rash gone, then two days later. Don't use longer than 14 days., Disp: 30 g, Rfl: 1  •  latanoprost (XALATAN) 0.005 % Solution, Place 1 Drop in both eyes every evening., Disp: , Rfl:   ALLERGIES:   Allergies   Allergen Reactions   • Latex    • Tetanus Toxoid      SURGHX:   Past Surgical History:   Procedure Laterality Date   • PRIMARY C SECTION       SOCHX:  reports that she has never smoked. She has  "never used smokeless tobacco. She reports that she drinks about 1.2 oz of alcohol per week. She reports that she does not use drugs.     Objective:   /72 (BP Location: Left arm, Patient Position: Sitting, BP Cuff Size: Adult)   Pulse 86   Temp 36.9 °C (98.4 °F) (Temporal)   Resp 12   Ht 1.791 m (5' 10.5\")   Wt 73.6 kg (162 lb 4.1 oz)   SpO2 96%   BMI 22.95 kg/m²     Physical Exam   Constitutional: She is oriented to person, place, and time. She appears well-developed and well-nourished. No distress.   HENT:   Head: Normocephalic and atraumatic.   Musculoskeletal:   Bilateral hands   General: no gross deformity, ecchymosis, or erythema  Palpation: Mild TTP along 1st dorsal compartment bilaterally   ROM: FROM  Strength: 5/5 throughout  Neuro: median, radial, ulnar nerves intact on testing  Vascular: radial, ulnar pulses 2+ and symmetric, cap refill <2 sec   Neurological: She is alert and oriented to person, place, and time. No sensory deficit.   Skin: Skin is warm and dry. She is not diaphoretic. No erythema.   Psychiatric: She has a normal mood and affect. Her behavior is normal. Judgment and thought content normal.     Assessment/Plan:   Assessment    1. De Quervain's tenosynovitis, bilateral  2. Carpal tunnel syndrome, bilateral  Patient has made great improvements with use of braces.  Not needing braces currently and still feeling well.  Advised that she may advance her activity and discontinue use of braces for now.  If symptoms begin to return, will return to night splinting.  Patient will follow-up on an as-needed basis if unable to manage her symptoms on her own.      "

## 2019-11-26 ENCOUNTER — GYNECOLOGY VISIT (OUTPATIENT)
Dept: OBGYN | Facility: CLINIC | Age: 64
End: 2019-11-26
Payer: COMMERCIAL

## 2019-11-26 ENCOUNTER — HOSPITAL ENCOUNTER (OUTPATIENT)
Facility: MEDICAL CENTER | Age: 64
End: 2019-11-26
Attending: OBSTETRICS & GYNECOLOGY
Payer: COMMERCIAL

## 2019-11-26 VITALS — BODY MASS INDEX: 22.49 KG/M2 | WEIGHT: 159 LBS | SYSTOLIC BLOOD PRESSURE: 128 MMHG | DIASTOLIC BLOOD PRESSURE: 73 MMHG

## 2019-11-26 DIAGNOSIS — Z11.51 SCREENING FOR HUMAN PAPILLOMAVIRUS (HPV): ICD-10-CM

## 2019-11-26 DIAGNOSIS — Z12.39 SCREENING FOR BREAST CANCER: ICD-10-CM

## 2019-11-26 DIAGNOSIS — Z12.4 CERVICAL CANCER SCREENING: ICD-10-CM

## 2019-11-26 DIAGNOSIS — Z01.419 WELL WOMAN EXAM WITH ROUTINE GYNECOLOGICAL EXAM: ICD-10-CM

## 2019-11-26 PROCEDURE — 99386 PREV VISIT NEW AGE 40-64: CPT | Performed by: OBSTETRICS & GYNECOLOGY

## 2019-11-26 PROCEDURE — 87624 HPV HI-RISK TYP POOLED RSLT: CPT

## 2019-11-26 PROCEDURE — 88175 CYTOPATH C/V AUTO FLUID REDO: CPT

## 2019-11-26 NOTE — NON-PROVIDER
"Pt here for new patient appt  Pt states no concerns at this time  Good #[phone]  Pharmacy verified.  Last pap 2 yrs \"WnL\"  No hx of abn pap  LMP:14-15 yrs ago    "

## 2019-11-26 NOTE — PROGRESS NOTES
Subjective:      Loli Pond is a 64 y.o. female who presents with New Patient (new patient)        CC: annual exam    HPI: 63 yo  menopausal female presents for annual exam.  No complaints.  No hx of abnormal pap smears. Family hx negative for breast cancer.      Past Medical History:   Diagnosis Date   • Genital HSV    • Glaucoma        Past Surgical History:   Procedure Laterality Date   • PRIMARY C SECTION           Current Outpatient Medications:   •  acyclovir (ZOVIRAX) 400 MG tablet, Take 400 mg by mouth every 8 hours as needed., Disp: , Rfl:   •  triamcinolone acetonide (KENALOG) 0.1 % Cream, Apply a thin layer to affected area BID x 10 days, Disp: 30 g, Rfl: 1  •  clotrimazole (LOTRIMIN) 1 % Cream, Apply a thin layer to affected area on feet BID until rash gone, then two days later. Don't use longer than 14 days., Disp: 30 g, Rfl: 1  •  latanoprost (XALATAN) 0.005 % Solution, Place 1 Drop in both eyes every evening., Disp: , Rfl:     Latex and Tetanus toxoid    Family History   Problem Relation Age of Onset   • Colon Cancer Mother    • Heart Attack Father        Social History     Socioeconomic History   • Marital status: Single     Spouse name: Not on file   • Number of children: Not on file   • Years of education: Not on file   • Highest education level: Not on file   Occupational History   • Not on file   Social Needs   • Financial resource strain: Not on file   • Food insecurity:     Worry: Not on file     Inability: Not on file   • Transportation needs:     Medical: Not on file     Non-medical: Not on file   Tobacco Use   • Smoking status: Never Smoker   • Smokeless tobacco: Never Used   Substance and Sexual Activity   • Alcohol use: Yes     Alcohol/week: 1.2 oz     Types: 2 Glasses of wine per week   • Drug use: No   • Sexual activity: Yes     Partners: Male     Birth control/protection: Post-Menopausal   Lifestyle   • Physical activity:     Days per week: Not on file     Minutes per  session: Not on file   • Stress: Not on file   Relationships   • Social connections:     Talks on phone: Not on file     Gets together: Not on file     Attends Rastafarian service: Not on file     Active member of club or organization: Not on file     Attends meetings of clubs or organizations: Not on file     Relationship status: Not on file   • Intimate partner violence:     Fear of current or ex partner: Not on file     Emotionally abused: Not on file     Physically abused: Not on file     Forced sexual activity: Not on file   Other Topics Concern   •  Service Not Asked   • Blood Transfusions Not Asked   • Caffeine Concern No   • Occupational Exposure Not Asked   • Hobby Hazards Not Asked   • Sleep Concern No   • Stress Concern Not Asked   • Weight Concern Yes   • Special Diet No   • Back Care Not Asked   • Exercise Yes   • Bike Helmet Not Asked   • Seat Belt Not Asked   • Self-Exams Not Asked   Social History Narrative   • Not on file       OB History    Para Term  AB Living   2 1 1 0 1 1   SAB TAB Ectopic Molar Multiple Live Births   0 1 0 0 0 1       ROS REVIEW OF SYSTEMS:    Pertinent positives and negatives mentioned in HPI.    All other systems reviewed and are negative or noncontributory.       Objective:     /73 (BP Location: Left arm, Patient Position: Sitting)   Wt 72.1 kg (159 lb)   Breastfeeding? No   BMI 22.49 kg/m²      Physical Exam      GENERAL: Alert, in no apparent distress  PSYCHIATRIC: Appropriate affect, intact insight and judgement.  NECK:  Nontender, no masses.  No Thyromegaly or nodules. No lymphadenopathy.  RESPIRATORY: Normal respiratory effort.  Lungs clear to auscultation.   CARDIOVASCULAR: RRR, no murmur, gallop, or rub.  ABDOMEN: Soft, nontender, nondistended.  No palpable masses.  No rebound or guarding.  No inguinal lymphadenopathy.  No hepatosplenomegaly.  No hernias.  BACK: No CVA tenderness  EXTREMITIES: No edema  SKIN: No rash    BREAST: No masses  or tenderness.  No skin changes.  No nipple inversion or discharge. No axillary lymphadenopathy.      GENITOURINARY:  Normal external genitalia, no lesions.  Normal urethral meatus, no masses or tenderness.  Normal bladder without fullness or masses.  Vagina atrophic, no vaginal discharge or lesions.  Cervix without lesions or discharge, nontender.  Uterus normal size, shape, and contour, nontender.  Adnexa nontender, no masses.  Normal anus and perineum.    Rectal Exam - not indicated.       Assessment/Plan:       1. Well woman exam with routine gynecological exam  Reviewed monthly self breast exams and need for yearly screening mammograms starting at age 40.  Discussed calcium intake, Vitamin D,  and weight bearing exercise for bone health. Colon cancer screening current - last colonoscopy 2017.    2. Screening for breast cancer  - MA-SCREENING MAMMO BILAT W/TOMOSYNTHESIS W/CAD; Future    3. Cervical cancer screening  - THINPREP PAP WITH HPV; Future    4. Screening for human papillomavirus (HPV)  - THINPREP PAP WITH HPV; Future    F/U 1 year or prn.

## 2019-11-28 LAB
CYTOLOGY REG CYTOL: NORMAL
HPV HR 12 DNA CVX QL NAA+PROBE: NEGATIVE
HPV16 DNA SPEC QL NAA+PROBE: NEGATIVE
HPV18 DNA SPEC QL NAA+PROBE: NEGATIVE
SPECIMEN SOURCE: NORMAL

## 2020-01-07 ENCOUNTER — NON-PROVIDER VISIT (OUTPATIENT)
Dept: MEDICAL GROUP | Facility: MEDICAL CENTER | Age: 65
End: 2020-01-07
Payer: COMMERCIAL

## 2020-01-07 DIAGNOSIS — Z23 NEED FOR VACCINATION: ICD-10-CM

## 2020-01-07 PROCEDURE — 90471 IMMUNIZATION ADMIN: CPT | Performed by: FAMILY MEDICINE

## 2020-01-07 PROCEDURE — 90686 IIV4 VACC NO PRSV 0.5 ML IM: CPT | Performed by: FAMILY MEDICINE

## 2020-01-07 NOTE — NON-PROVIDER
"Loli Pond is a 64 y.o. female here for a non-provider visit for:   FLU    Reason for immunization: Annual Flu Vaccine  Immunization records indicate need for vaccine: Yes, confirmed with Epic  Minimum interval has been met for this vaccine: Yes  ABN completed: Not Indicated    Order and dose verified by: CLEMENTE  VIS Dated  8/15/19 was given to patient: Yes  All IAC Questionnaire questions were answered \"No.\"    Patient tolerated injection and no adverse effects were observed or reported: Yes    Pt scheduled for next dose in series: No    "

## 2020-05-22 ENCOUNTER — HOSPITAL ENCOUNTER (OUTPATIENT)
Dept: LAB | Facility: MEDICAL CENTER | Age: 65
End: 2020-05-22
Attending: FAMILY MEDICINE
Payer: COMMERCIAL

## 2020-05-22 DIAGNOSIS — Z00.00 HEALTHCARE MAINTENANCE: ICD-10-CM

## 2020-05-22 LAB
CHOLEST SERPL-MCNC: 164 MG/DL (ref 100–199)
FASTING STATUS PATIENT QL REPORTED: NORMAL
GLUCOSE SERPL-MCNC: 97 MG/DL (ref 65–99)
HDLC SERPL-MCNC: 86 MG/DL
LDLC SERPL CALC-MCNC: 68 MG/DL
TRIGL SERPL-MCNC: 49 MG/DL (ref 0–149)

## 2020-05-22 PROCEDURE — 36415 COLL VENOUS BLD VENIPUNCTURE: CPT

## 2020-05-22 PROCEDURE — 80061 LIPID PANEL: CPT

## 2020-05-22 PROCEDURE — 82947 ASSAY GLUCOSE BLOOD QUANT: CPT

## 2020-05-26 ENCOUNTER — HOSPITAL ENCOUNTER (OUTPATIENT)
Dept: RADIOLOGY | Facility: MEDICAL CENTER | Age: 65
End: 2020-05-26
Attending: OBSTETRICS & GYNECOLOGY
Payer: COMMERCIAL

## 2020-05-26 DIAGNOSIS — Z12.39 SCREENING FOR BREAST CANCER: ICD-10-CM

## 2020-05-26 PROCEDURE — 77067 SCR MAMMO BI INCL CAD: CPT

## 2020-05-29 ENCOUNTER — TELEMEDICINE (OUTPATIENT)
Dept: MEDICAL GROUP | Facility: MEDICAL CENTER | Age: 65
End: 2020-05-29
Payer: COMMERCIAL

## 2020-05-29 VITALS — HEART RATE: 76 BPM | HEIGHT: 71 IN | WEIGHT: 160 LBS | BODY MASS INDEX: 22.4 KG/M2

## 2020-05-29 DIAGNOSIS — Z00.00 HEALTHCARE MAINTENANCE: ICD-10-CM

## 2020-05-29 DIAGNOSIS — Z87.898 HISTORY OF FEVER: ICD-10-CM

## 2020-05-29 DIAGNOSIS — H00.012 HORDEOLUM EXTERNUM OF RIGHT LOWER EYELID: ICD-10-CM

## 2020-05-29 PROCEDURE — 99396 PREV VISIT EST AGE 40-64: CPT | Mod: 95,CR | Performed by: FAMILY MEDICINE

## 2020-05-29 RX ORDER — ERYTHROMYCIN 5 MG/G
OINTMENT OPHTHALMIC
Qty: 1 TUBE | Refills: 0 | Status: SHIPPED | OUTPATIENT
Start: 2020-05-29 | End: 2022-01-12

## 2020-05-29 NOTE — ASSESSMENT & PLAN NOTE
"Lipids: done 2020 and normal.   Fasting Glucose: done 2020 and normal.   Hepatitis C Screen: done 2018 and normal.   Colonoscopy: done 4/19/17 with 5 year recall, see \"media\".  Mammogram: done 2020.   Pap: done 2019 with cotesting and normal.    Tdap: done 2018.  "

## 2020-05-29 NOTE — PROGRESS NOTES
"Telemedicine Visit: Established Patient     This encounter was conducted via [employer].   Verbal consent was obtained. Patient's identity was verified.    Subjective:     Loli Pond is a 64 y.o. female presenting for a wellness exam.    Healthcare maintenance  Lipids: done 2020 and normal.   Fasting Glucose: done 2020 and normal.   Hepatitis C Screen: done 2018 and normal.   Colonoscopy: done 4/19/17 with 5 year recall, see \"media\".  Mammogram: done 2020.   Pap: done 2019 with cotesting and normal.    Tdap: done 2018.    Hordeolum externum of right lower eyelid  Recently the patient has noticed some discomfort and redness to the right lower eyelid.  She states that she does have a chalazion present in that eyelid.  She called her eye doctor who is able to see her in about 2 weeks.  She is currently using eyedrops but this does not seem to be helping.      ROS no fever    Allergies   Allergen Reactions   • Latex    • Tetanus Toxoid        Current medicines (including changes today)  Current Outpatient Medications   Medication Sig Dispense Refill   • erythromycin 5 MG/GM Ointment Apply a thin layer to eyelid TID x 7 days 1 Tube 0   • acyclovir (ZOVIRAX) 400 MG tablet TAKE 1 TAB BY MOUTH 3 TIMES A DAY FOR 5 DAYS. USE AT FIRST SIGN OF FLARE. 60 Tab 3   • triamcinolone acetonide (KENALOG) 0.1 % Cream Apply a thin layer to affected area BID x 10 days 30 g 1   • clotrimazole (LOTRIMIN) 1 % Cream Apply a thin layer to affected area on feet BID until rash gone, then two days later. Don't use longer than 14 days. 30 g 1   • latanoprost (XALATAN) 0.005 % Solution Place 1 Drop in both eyes every evening.       No current facility-administered medications for this visit.        Patient Active Problem List    Diagnosis Date Noted   • Hordeolum externum of right lower eyelid 05/29/2020   • Bilateral hand pain 05/23/2019   • Hemorrhoids 05/07/2019   • Healthcare maintenance 03/15/2018   • Seborrheic keratoses 03/15/2018 " "  • Depression 03/15/2018   • Herpes simplex infection of genitourinary system 10/17/2017       Family History   Problem Relation Age of Onset   • Colon Cancer Mother    • Heart Attack Father        She  has a past medical history of Genital HSV and Glaucoma. She also has no past medical history of Arthritis.  She  has a past surgical history that includes primary c section.       Objective:   Vitals obtained by patient:  Pulse 76   Ht 1.791 m (5' 10.5\")   Wt 72.6 kg (160 lb)   BMI 22.63 kg/m²       Physical Exam:  Constitutional: Alert, no distress, well-groomed.  Skin: No rashes in visible areas.  Eye: Round. Conjunctiva clear, lids difficult to see over the video but with some slight redness in R lower lid, no e/o proptosis or pain on movement of eyes. No periorbital redness.    Assessment and Plan:     1. Healthcare maintenance  - see HPI.   -Age-appropriate anticipatory guidance discussed including diet and exercise.    2. Hordeolum externum of right lower eyelid  I suspect that the patient has a hordeolum.  There is no evidence of periorbital cellulitis.  I recommended warm compresses daily and erythromycin ointment.  If her condition worsens over the weekend I asked her to go to the urgent care.  I am happy that she will be seeing her eye doctor in about 2 weeks.          Follow-up: Return in about 2 years (around 5/29/2022) for Wellness Visit, Long.         "

## 2020-05-29 NOTE — ASSESSMENT & PLAN NOTE
Recently the patient has noticed some discomfort and redness to the right lower eyelid.  She states that she does have a chalazion present in that eyelid.  She called her eye doctor who is able to see her in about 2 weeks.  She is currently using eyedrops but this does not seem to be helping.

## 2020-07-24 ENCOUNTER — TELEMEDICINE (OUTPATIENT)
Dept: MEDICAL GROUP | Facility: MEDICAL CENTER | Age: 65
End: 2020-07-24
Payer: COMMERCIAL

## 2020-07-24 VITALS — BODY MASS INDEX: 22.4 KG/M2 | HEIGHT: 71 IN | HEART RATE: 92 BPM | WEIGHT: 160 LBS

## 2020-07-24 DIAGNOSIS — M79.89 SWELLING OF RIGHT INDEX FINGER: ICD-10-CM

## 2020-07-24 DIAGNOSIS — R51.9 NONINTRACTABLE HEADACHE, UNSPECIFIED CHRONICITY PATTERN, UNSPECIFIED HEADACHE TYPE: ICD-10-CM

## 2020-07-24 PROBLEM — M79.641 RIGHT HAND PAIN: Status: ACTIVE | Noted: 2020-07-24

## 2020-07-24 PROCEDURE — 99214 OFFICE O/P EST MOD 30 MIN: CPT | Mod: 95,CR | Performed by: FAMILY MEDICINE

## 2020-07-24 RX ORDER — ACYCLOVIR 400 MG/1
TABLET ORAL
Qty: 60 TAB | Refills: 3 | Status: SHIPPED | OUTPATIENT
Start: 2020-07-24 | End: 2021-12-07 | Stop reason: SDUPTHER

## 2020-07-24 NOTE — PROGRESS NOTES
"Telemedicine Visit: Established Patient     This encounter was conducted via [email or web address].   Verbal consent was obtained. Patient's identity was verified.    Subjective:     Loli Pond is a 64 y.o. female presenting for evaluation and management of headache.    Swelling of right index finger  2 weeks ago the patient sustained what sounds like a paper cut on the medial aspect of her right index finger.  This ultimately healed with some home wound care.  Since then, the patient states that it might be a little bit more swollen than the left.  There is no pain, redness, tenderness or warmth.  Patient is able to fully move the digit without any problems.    Headache  Patient states that yesterday she had a several long history of a mild severity frontal headache that subsequently resolved after she started to \"eat better\" and drink lots of water.  She was worried about this possibly being COVID.  She is currently asymptomatic.  She denies fever, chills, chest pain, shortness of breath or cough.      ROS see HPI.    Allergies   Allergen Reactions   • Latex    • Tetanus Toxoid        Current medicines (including changes today)  Current Outpatient Medications   Medication Sig Dispense Refill   • acyclovir (ZOVIRAX) 400 MG tablet TAKE 1 TAB BY MOUTH 3 TIMES A DAY FOR 5 DAYS. USE AT FIRST SIGN OF FLARE. 60 Tab 3   • erythromycin 5 MG/GM Ointment Apply a thin layer to eyelid TID x 7 days 1 Tube 0   • triamcinolone acetonide (KENALOG) 0.1 % Cream Apply a thin layer to affected area BID x 10 days 30 g 1   • clotrimazole (LOTRIMIN) 1 % Cream Apply a thin layer to affected area on feet BID until rash gone, then two days later. Don't use longer than 14 days. 30 g 1   • latanoprost (XALATAN) 0.005 % Solution Place 1 Drop in both eyes every evening.       No current facility-administered medications for this visit.        Patient Active Problem List    Diagnosis Date Noted   • Headache 07/24/2020   • Swelling of right index " "finger 07/24/2020   • Hordeolum externum of right lower eyelid 05/29/2020   • Bilateral hand pain 05/23/2019   • Hemorrhoids 05/07/2019   • Healthcare maintenance 03/15/2018   • Seborrheic keratoses 03/15/2018   • Depression 03/15/2018   • Herpes simplex infection of genitourinary system 10/17/2017       Family History   Problem Relation Age of Onset   • Colon Cancer Mother    • Heart Attack Father        She  has a past medical history of Genital HSV and Glaucoma. She also has no past medical history of Arthritis.  She  has a past surgical history that includes primary c section.       Objective:   Vitals obtained by patient:  Pulse 92   Ht 1.791 m (5' 10.5\")   Wt 72.6 kg (160 lb)   BMI 22.63 kg/m²       Physical Exam:  Constitutional: Alert, no distress, well-groomed.  Skin: No rashes in visible areas.  MSK: There is a little difficult to examine the finger over the video, however, as far as I can tell there is minimal if any swelling in the right index finger.  There is no obvious redness.  The patient is moving the finger without issue.    Assessment and Plan:     1. Nonintractable headache, unspecified chronicity pattern, unspecified headache type  In the absence of other symptoms I informed her that this is less likely to be COVID.  I encouraged hydration and provided her with general advice on preventing COVID transmission and infection.    2. Swelling of right index finger  Examination appears fairly reassuring as far as I can tell.  I discussed with her the risk of infection and the need to let me know if her condition worsens in any way or she develops any evidence of infection.  Otherwise, I suspect that this is perhaps little osteoarthritis.          Follow-up: Return if symptoms worsen or fail to improve.         "

## 2020-07-24 NOTE — ASSESSMENT & PLAN NOTE
2 weeks ago the patient sustained what sounds like a paper cut on the medial aspect of her right index finger.  This ultimately healed with some home wound care.  Since then, the patient states that it might be a little bit more swollen than the left.  There is no pain, redness, tenderness or warmth.  Patient is able to fully move the digit without any problems.

## 2020-07-24 NOTE — ASSESSMENT & PLAN NOTE
"Patient states that yesterday she had a several long history of a mild severity frontal headache that subsequently resolved after she started to \"eat better\" and drink lots of water.  She was worried about this possibly being COVID.  She is currently asymptomatic.  She denies fever, chills, chest pain, shortness of breath or cough.  "

## 2020-09-09 ENCOUNTER — OFFICE VISIT (OUTPATIENT)
Dept: MEDICAL GROUP | Facility: MEDICAL CENTER | Age: 65
End: 2020-09-09
Payer: COMMERCIAL

## 2020-09-09 VITALS
BODY MASS INDEX: 23.1 KG/M2 | HEART RATE: 88 BPM | WEIGHT: 165 LBS | DIASTOLIC BLOOD PRESSURE: 70 MMHG | SYSTOLIC BLOOD PRESSURE: 110 MMHG | RESPIRATION RATE: 18 BRPM | HEIGHT: 71 IN | OXYGEN SATURATION: 95 % | TEMPERATURE: 98.8 F

## 2020-09-09 DIAGNOSIS — R20.0 NUMBNESS: ICD-10-CM

## 2020-09-09 DIAGNOSIS — M72.2 PLANTAR FASCIITIS: ICD-10-CM

## 2020-09-09 PROBLEM — H00.012 HORDEOLUM EXTERNUM OF RIGHT LOWER EYELID: Status: RESOLVED | Noted: 2020-05-29 | Resolved: 2020-09-09

## 2020-09-09 PROBLEM — L82.1 SEBORRHEIC KERATOSES: Status: RESOLVED | Noted: 2018-03-15 | Resolved: 2020-09-09

## 2020-09-09 PROBLEM — M79.89 SWELLING OF RIGHT INDEX FINGER: Status: RESOLVED | Noted: 2020-07-24 | Resolved: 2020-09-09

## 2020-09-09 PROCEDURE — 99214 OFFICE O/P EST MOD 30 MIN: CPT | Performed by: FAMILY MEDICINE

## 2020-09-09 ASSESSMENT — PATIENT HEALTH QUESTIONNAIRE - PHQ9
SUM OF ALL RESPONSES TO PHQ QUESTIONS 1-9: 3
2. FEELING DOWN, DEPRESSED, IRRITABLE, OR HOPELESS: SEVERAL DAYS
7. TROUBLE CONCENTRATING ON THINGS, SUCH AS READING THE NEWSPAPER OR WATCHING TELEVISION: NOT AT ALL
3. TROUBLE FALLING OR STAYING ASLEEP OR SLEEPING TOO MUCH: NOT AT ALL
5. POOR APPETITE OR OVEREATING: SEVERAL DAYS
9. THOUGHTS THAT YOU WOULD BE BETTER OFF DEAD, OR OF HURTING YOURSELF: NOT AT ALL
1. LITTLE INTEREST OR PLEASURE IN DOING THINGS: NOT AT ALL
SUM OF ALL RESPONSES TO PHQ9 QUESTIONS 1 AND 2: 1
8. MOVING OR SPEAKING SO SLOWLY THAT OTHER PEOPLE COULD HAVE NOTICED. OR THE OPPOSITE, BEING SO FIGETY OR RESTLESS THAT YOU HAVE BEEN MOVING AROUND A LOT MORE THAN USUAL: NOT AT ALL
6. FEELING BAD ABOUT YOURSELF - OR THAT YOU ARE A FAILURE OR HAVE LET YOURSELF OR YOUR FAMILY DOWN: NOT AL ALL
4. FEELING TIRED OR HAVING LITTLE ENERGY: SEVERAL DAYS

## 2020-09-09 NOTE — PROGRESS NOTES
[facility]  Progress Note  Established Patient    Subjective:   Loli Pond is a 65 y.o. female here today with a chief complaint of plantar fasciitis. The patient is alone.     Plantar fasciitis  Patient presents with a new problem today.  For the past month she has had right heel pain and discomfort worse when she first wakes up in the morning.  She has tried some yoga balls that her friend provided to her.  These do provide some relief.  Her symptoms are currently moderate in severity.  It is worse when she walks on her heels.  This is also associated with a right lateral foot numbness, discussed elsewhere.    Numbness  Patient states that 1 month ago she developed a mild numbness to the right lateral foot without any associated trauma or injury, however, interestingly this co-occurred with what sounds like plantar fasciitis discussed elsewhere.  She denies back pain or sharp, shooting pain down either leg.      Current Outpatient Medications on File Prior to Visit   Medication Sig Dispense Refill   • acyclovir (ZOVIRAX) 400 MG tablet TAKE 1 TAB BY MOUTH 3 TIMES A DAY FOR 5 DAYS. USE AT FIRST SIGN OF FLARE. 60 Tab 3   • erythromycin 5 MG/GM Ointment Apply a thin layer to eyelid TID x 7 days 1 Tube 0   • triamcinolone acetonide (KENALOG) 0.1 % Cream Apply a thin layer to affected area BID x 10 days 30 g 1   • clotrimazole (LOTRIMIN) 1 % Cream Apply a thin layer to affected area on feet BID until rash gone, then two days later. Don't use longer than 14 days. 30 g 1   • latanoprost (XALATAN) 0.005 % Solution Place 1 Drop in both eyes every evening.       No current facility-administered medications on file prior to visit.        Past Medical History:   Diagnosis Date   • Genital HSV    • Glaucoma        Allergies: Latex and Tetanus toxoid    Surgical History:  has a past surgical history that includes primary c section.    Family History: family history includes Colon Cancer in her mother; Heart  "Attack in her father.    Social History:  reports that she has never smoked. She has never used smokeless tobacco. She reports current alcohol use of about 1.2 oz of alcohol per week. She reports that she does not use drugs.    ROS: no fever or cough.        Objective:     Vitals:    09/09/20 1049   BP: 110/70   BP Location: Left arm   Patient Position: Sitting   BP Cuff Size: Adult   Pulse: 88   Resp: 18   Temp: 37.1 °C (98.8 °F)   TempSrc: Temporal   SpO2: 95%   Weight: 74.8 kg (165 lb)   Height: 1.791 m (5' 10.5\")       Physical Exam:  General: alert in no apparent distress.   Neuro: Sensation intact in all dermatomal distributions the bilateral lower extremities, however, in the right very distal S1 distribution there is some slight numbness, different than the left.  This does not extend proximally.  MSK: Strength 5 to 5 x 4 with no foot drop.  Patient able to stand on her tiptoes and walk on her heels without a problem.  There is tenderness to palpation of the medial calf and heel tuberosity of the right heel.  No Tinel sign over the right tarsal tunnel.        Assessment and Plan:     1. Numbness  I am uncertain as to what is causing what appears to be a very mild, distal S1 neuropathy.  Perhaps this is a compression neuropathy but I do not think it is related to the plantar fasciitis.  It does not seem consistent with tarsal tunnel syndrome.  I discussed offloading the area and the patient will let me know how she is doing by my chart in about 2 weeks.    2. Plantar fasciitis  Recommended plantar fascial stretches and an [employer] handout was provided to her.  She will let me know how she is doing about 2 weeks.        Followup: Return if symptoms worsen or fail to improve.         "

## 2020-09-09 NOTE — ASSESSMENT & PLAN NOTE
Patient presents with a new problem today.  For the past month she has had right heel pain and discomfort worse when she first wakes up in the morning.  She has tried some yoga balls that her friend provided to her.  These do provide some relief.  Her symptoms are currently moderate in severity.  It is worse when she walks on her heels.  This is also associated with a right lateral foot numbness, discussed elsewhere.

## 2020-09-09 NOTE — ASSESSMENT & PLAN NOTE
Patient states that 1 month ago she developed a mild numbness to the right lateral foot without any associated trauma or injury, however, interestingly this co-occurred with what sounds like plantar fasciitis discussed elsewhere.  She denies back pain or sharp, shooting pain down either leg.

## 2020-09-15 DIAGNOSIS — R20.0 NUMBNESS: ICD-10-CM

## 2020-12-01 ENCOUNTER — TELEMEDICINE (OUTPATIENT)
Dept: MEDICAL GROUP | Facility: MEDICAL CENTER | Age: 65
End: 2020-12-01
Payer: COMMERCIAL

## 2020-12-01 VITALS — WEIGHT: 167 LBS | HEIGHT: 71 IN | HEART RATE: 96 BPM | BODY MASS INDEX: 23.38 KG/M2

## 2020-12-01 DIAGNOSIS — Z71.84 TRAVEL ADVICE ENCOUNTER: ICD-10-CM

## 2020-12-01 PROCEDURE — 99212 OFFICE O/P EST SF 10 MIN: CPT | Mod: 95,CR | Performed by: FAMILY MEDICINE

## 2020-12-01 NOTE — PROGRESS NOTES
Telemedicine Visit: Established Patient     This encounter was conducted via Zoom.   Verbal consent was obtained. Patient's identity was verified.    Subjective:     Loli Pond is a 65 y.o. female presenting for evaluation and management of travel advice.    Travel advice encounter  Patient recently went to [facility] to visit her son.  Before she left, she got tested for Covid and was negative.  She has now returned from the trip and is asymptomatic.  She denies fever, cough, shortness of breath.  She is wondering if she should be tested for Covid again and is interested in being antibody tested.      ROS see HPI.    Allergies   Allergen Reactions   • Latex    • Tetanus Toxoid        Current medicines (including changes today)  Current Outpatient Medications   Medication Sig Dispense Refill   • acyclovir (ZOVIRAX) 400 MG tablet TAKE 1 TAB BY MOUTH 3 TIMES A DAY FOR 5 DAYS. USE AT FIRST SIGN OF FLARE. 60 Tab 3   • erythromycin 5 MG/GM Ointment Apply a thin layer to eyelid TID x 7 days 1 Tube 0   • triamcinolone acetonide (KENALOG) 0.1 % Cream Apply a thin layer to affected area BID x 10 days 30 g 1   • clotrimazole (LOTRIMIN) 1 % Cream Apply a thin layer to affected area on feet BID until rash gone, then two days later. Don't use longer than 14 days. 30 g 1   • latanoprost (XALATAN) 0.005 % Solution Place 1 Drop in both eyes every evening.       No current facility-administered medications for this visit.        Patient Active Problem List    Diagnosis Date Noted   • Travel advice encounter 12/01/2020   • Numbness 09/09/2020   • Plantar fasciitis 09/09/2020   • Headache 07/24/2020   • Bilateral hand pain 05/23/2019   • Hemorrhoids 05/07/2019   • Healthcare maintenance 03/15/2018   • Depression 03/15/2018   • Herpes simplex infection of genitourinary system 10/17/2017       Family History   Problem Relation Age of Onset   • Colon Cancer Mother    • Heart Attack Father        She  has a past  "medical history of Genital HSV and Glaucoma. She also has no past medical history of Arthritis.  She  has a past surgical history that includes primary c section.       Objective:   Vitals obtained by patient:  Pulse 96   Ht 1.791 m (5' 10.5\")   Wt 75.8 kg (167 lb)   BMI 23.62 kg/m²       Physical Exam:  Constitutional: Alert, no distress, well-groomed.  Psych: normal affect.      Assessment and Plan:     1. Travel advice encounter  I discussed with the patient limitations of PCR testing in asymptomatic patients.  I offered to test her but she declines, which I think is reasonable.  I did recommend that she quarantine for 2 weeks after returning from [place].  I asked that she let me know if she develops symptoms.  She is interested in antibody testing and I have placed this order.  - SARS CoV-2 Ab, Total; Future          Follow-up: Return if symptoms worsen or fail to improve.         "

## 2020-12-01 NOTE — ASSESSMENT & PLAN NOTE
Patient recently went to [facility] to visit her son.  Before she left, she got tested for Covid and was negative.  She has now returned from the trip and is asymptomatic.  She denies fever, cough, shortness of breath.  She is wondering if she should be tested for Covid again and is interested in being antibody tested.

## 2020-12-04 ENCOUNTER — HOSPITAL ENCOUNTER (OUTPATIENT)
Dept: LAB | Facility: MEDICAL CENTER | Age: 65
End: 2020-12-04
Attending: FAMILY MEDICINE
Payer: COMMERCIAL

## 2020-12-04 DIAGNOSIS — Z71.84 TRAVEL ADVICE ENCOUNTER: ICD-10-CM

## 2020-12-04 LAB — SARS-COV-2 AB SERPL QL IA: NORMAL

## 2020-12-04 PROCEDURE — 86769 SARS-COV-2 COVID-19 ANTIBODY: CPT

## 2020-12-04 PROCEDURE — 36415 COLL VENOUS BLD VENIPUNCTURE: CPT

## 2020-12-10 NOTE — PROGRESS NOTES
Volunteer clearance reviewed and signed. Quantiferon lab result documented in immunizations. Document returned to monthly assigned MA.      
99

## 2020-12-28 DIAGNOSIS — Z23 NEED FOR VACCINATION: ICD-10-CM

## 2021-12-07 RX ORDER — ACYCLOVIR 400 MG/1
TABLET ORAL
Qty: 60 TABLET | Refills: 3 | Status: SHIPPED | OUTPATIENT
Start: 2021-12-07 | End: 2022-01-12

## 2021-12-07 NOTE — TELEPHONE ENCOUNTER
Was the patient seen in the last year in this department? Yes   Does patient have an active prescription for medications requested? No   Received Request Via: Patient

## 2022-01-12 ENCOUNTER — TELEMEDICINE (OUTPATIENT)
Dept: MEDICAL GROUP | Facility: MEDICAL CENTER | Age: 67
End: 2022-01-12
Payer: MEDICARE

## 2022-01-12 VITALS — BODY MASS INDEX: 22.4 KG/M2 | HEIGHT: 71 IN | WEIGHT: 160 LBS | TEMPERATURE: 98.7 F

## 2022-01-12 DIAGNOSIS — U07.1 COVID-19: ICD-10-CM

## 2022-01-12 DIAGNOSIS — Z00.00 HEALTHCARE MAINTENANCE: ICD-10-CM

## 2022-01-12 DIAGNOSIS — M72.2 PLANTAR FASCIITIS: ICD-10-CM

## 2022-01-12 PROBLEM — Z71.84 TRAVEL ADVICE ENCOUNTER: Status: RESOLVED | Noted: 2020-12-01 | Resolved: 2022-01-12

## 2022-01-12 PROBLEM — R20.0 NUMBNESS: Status: RESOLVED | Noted: 2020-09-09 | Resolved: 2022-01-12

## 2022-01-12 PROCEDURE — 99213 OFFICE O/P EST LOW 20 MIN: CPT | Mod: 95 | Performed by: FAMILY MEDICINE

## 2022-01-12 ASSESSMENT — PATIENT HEALTH QUESTIONNAIRE - PHQ9: CLINICAL INTERPRETATION OF PHQ2 SCORE: 0

## 2022-01-12 NOTE — PROGRESS NOTES
"Virtual Visit: Established Patient   This visit was conducted via Zoom using secure and encrypted videoconferencing technology.   The patient was in a private location in the state of [place].    The patient's identity was confirmed and verbal consent was obtained for this virtual visit.    Subjective:   CC:   Chief Complaint   Patient presents with   • URI     Loli Pond is a 66 y.o. female presenting for evaluation and management of:    COVID-19  The patient describes 4 days of myalgia helped by advil, fatigue, congestion, periodic loss of focus. She is starting to improve. She was tested for COVID and this was positive. She endorses occasional, intermittent cough. She is self-isolating. She denies SOB.     Plantar fasciitis  Previously treated at [facility]. Requesting referral back.           Current medicines (including changes today)  No current outpatient medications on file.     No current facility-administered medications for this visit.       Patient Active Problem List    Diagnosis Date Noted   • COVID-19 01/12/2022   • Plantar fasciitis 09/09/2020   • Headache 07/24/2020   • Bilateral hand pain 05/23/2019   • Hemorrhoids 05/07/2019   • Healthcare maintenance 03/15/2018   • Depression 03/15/2018   • Herpes simplex infection of genitourinary system 10/17/2017        Objective:   Temp 37.1 °C (98.7 °F) (Oral) Comment: Pt reported  Ht 1.791 m (5' 10.5\") Comment: Pt reported  Wt 72.6 kg (160 lb) Comment: Pt reported  BMI 22.63 kg/m²     Physical Exam:  Constitutional: Alert, no distress, well-groomed.  Respiratory: Unlabored respiratory effort, no cough or audible wheeze    Assessment and Plan:   The following treatment plan was discussed:     1. COVID-19  - encouraged supportive care.   -Advised her to let me know of any worsening.    2. Plantar fasciitis  - Referral to Orthopedics    3. Healthcare maintenance  Patient requests referral to Dr. Jorge for her yearly gynecologic exam.  - Referral to " Gynecology    Follow-up: Return if symptoms worsen or fail to improve.

## 2022-01-12 NOTE — ASSESSMENT & PLAN NOTE
The patient describes 4 days of myalgia helped by advil, fatigue, congestion, periodic loss of focus. She is starting to improve. She was tested for COVID and this was positive. She endorses occasional, intermittent cough. She is self-isolating. She denies SOB.

## 2022-01-28 ENCOUNTER — HOSPITAL ENCOUNTER (OUTPATIENT)
Dept: RADIOLOGY | Facility: MEDICAL CENTER | Age: 67
End: 2022-01-28
Attending: OBSTETRICS & GYNECOLOGY
Payer: MEDICARE

## 2022-01-28 ENCOUNTER — GYNECOLOGY VISIT (OUTPATIENT)
Dept: OBGYN | Facility: CLINIC | Age: 67
End: 2022-01-28
Payer: MEDICARE

## 2022-01-28 ENCOUNTER — HOSPITAL ENCOUNTER (OUTPATIENT)
Facility: MEDICAL CENTER | Age: 67
End: 2022-01-28
Attending: OBSTETRICS & GYNECOLOGY
Payer: MEDICARE

## 2022-01-28 VITALS
BODY MASS INDEX: 23.8 KG/M2 | HEIGHT: 71 IN | DIASTOLIC BLOOD PRESSURE: 68 MMHG | WEIGHT: 170 LBS | SYSTOLIC BLOOD PRESSURE: 112 MMHG

## 2022-01-28 DIAGNOSIS — Z11.3 SCREENING FOR VENEREAL DISEASE: ICD-10-CM

## 2022-01-28 DIAGNOSIS — Z12.4 SCREENING FOR CERVICAL CANCER: ICD-10-CM

## 2022-01-28 DIAGNOSIS — Z12.31 ENCOUNTER FOR SCREENING MAMMOGRAM FOR MALIGNANT NEOPLASM OF BREAST: ICD-10-CM

## 2022-01-28 DIAGNOSIS — Z01.419 WELL WOMAN EXAM WITH ROUTINE GYNECOLOGICAL EXAM: ICD-10-CM

## 2022-01-28 PROCEDURE — G0101 CA SCREEN;PELVIC/BREAST EXAM: HCPCS | Performed by: OBSTETRICS & GYNECOLOGY

## 2022-01-28 PROCEDURE — 77063 BREAST TOMOSYNTHESIS BI: CPT

## 2022-01-28 PROCEDURE — 88175 CYTOPATH C/V AUTO FLUID REDO: CPT

## 2022-01-28 PROCEDURE — 87591 N.GONORRHOEAE DNA AMP PROB: CPT

## 2022-01-28 PROCEDURE — 87491 CHLMYD TRACH DNA AMP PROBE: CPT

## 2022-01-28 PROCEDURE — 87624 HPV HI-RISK TYP POOLED RSLT: CPT

## 2022-01-28 NOTE — PROGRESS NOTES
"Subjective     Lolibrandon Pond is a 66 y.o. female who presents with Annual Exam (Annual exam)        CC: annual exam    HPI: 66-year-old  2 para 1-0-1-1 postmenopausal female presents for annual exam.  She has been menopausal since approximately .  She reports no vaginal bleeding.  She does have some pain with intercourse, and requests STI testing.  She denies vaginal discharge.  Family history is negative for breast cancer.  She denies history of abnormal Pap smears or cervical dysplasia.    Past Medical History:   Diagnosis Date   • Genital HSV    • Glaucoma        Past Surgical History:   Procedure Laterality Date   • PRIMARY C SECTION         No current outpatient medications on file.    Latex and Tetanus toxoid    Family History   Problem Relation Age of Onset   • Colon Cancer Mother    • Heart Attack Father            OB History    Para Term  AB Living   2 1 1 0 1 1   SAB IAB Ectopic Molar Multiple Live Births   0 1 0 0 0 1       ROS - as per hpi         Objective     /68 (BP Location: Right arm, Patient Position: Sitting)   Ht 1.803 m (5' 11\")   Wt 77.1 kg (170 lb)   BMI 23.71 kg/m²      Physical Exam      GENERAL: Alert, in no apparent distress  PSYCHIATRIC: Appropriate affect, intact insight and judgement.  NECK:  Nontender, no masses.  No Thyromegaly or nodules. No lymphadenopathy.  RESPIRATORY: Normal respiratory effort.  Lungs clear to auscultation.   CARDIOVASCULAR: RRR, no murmur, gallop, or rub.  ABDOMEN: Soft, nontender, nondistended.  No palpable masses.  No rebound or guarding.  No inguinal lymphadenopathy.  No hepatosplenomegaly.  No hernias.  BACK: No CVA tenderness  EXTREMITIES: No edema  SKIN: No rash    BREAST: No masses or tenderness.  No skin changes.  No nipple inversion or discharge. No axillary lymphadenopathy.      GENITOURINARY:  Normal external genitalia, no lesions.  Normal urethral meatus, no masses or tenderness.  Normal bladder without " fullness or masses.  Vagina pale, atrophic, no vaginal discharge or lesions.  Cervix without lesions or discharge, nontender.  Uterus normal size, shape, and contour, nontender.  Adnexa nontender, no masses.  Normal anus and perineum.    Rectal Exam - not indicated.              Assessment & Plan        1. Well woman exam with routine gynecological exam  Reviewed monthly self breast exams and need for yearly screening mammograms starting at age 40.  Discussed use of lubricants and vaginal moisturizers for dyspareunia.  We also discussed vaginal estrogen is a possibility, conservative measures fail  Colon cancer screening is current, she had a screening colonoscopy in 2017.    2. Screening for cervical cancer  - THINPREP PAP W/HPV AND CTNG    3. Screening for venereal disease  Per patient request  - THINPREP PAP W/HPV AND CTNG  - HIV AG/AB COMBO ASSAY SCREENING; Future  - T.PALLIDUM AB EIA; Future  - HEP C VIRUS ANTIBODY; Future    4. Encounter for screening mammogram for malignant neoplasm of breast  - MA-SCREENING MAMMO BILAT W/TOMOSYNTHESIS W/CAD; Future     F/U prn

## 2022-01-31 LAB
C TRACH DNA GENITAL QL NAA+PROBE: NEGATIVE
CYTOLOGY REG CYTOL: NORMAL
HPV HR 12 DNA CVX QL NAA+PROBE: NEGATIVE
HPV16 DNA SPEC QL NAA+PROBE: NEGATIVE
HPV18 DNA SPEC QL NAA+PROBE: NEGATIVE
N GONORRHOEA DNA GENITAL QL NAA+PROBE: NEGATIVE
SPECIMEN SOURCE: NORMAL
SPECIMEN SOURCE: NORMAL

## 2022-02-03 ENCOUNTER — HOSPITAL ENCOUNTER (OUTPATIENT)
Dept: LAB | Facility: MEDICAL CENTER | Age: 67
End: 2022-02-03
Attending: OBSTETRICS & GYNECOLOGY
Payer: COMMERCIAL

## 2022-02-03 DIAGNOSIS — Z11.3 SCREENING FOR VENEREAL DISEASE: ICD-10-CM

## 2022-04-26 ENCOUNTER — OFFICE VISIT (OUTPATIENT)
Dept: MEDICAL GROUP | Facility: MEDICAL CENTER | Age: 67
End: 2022-04-26
Payer: MEDICARE

## 2022-04-26 VITALS
BODY MASS INDEX: 23.74 KG/M2 | WEIGHT: 165.8 LBS | OXYGEN SATURATION: 100 % | TEMPERATURE: 98.6 F | DIASTOLIC BLOOD PRESSURE: 82 MMHG | RESPIRATION RATE: 16 BRPM | HEIGHT: 70 IN | SYSTOLIC BLOOD PRESSURE: 132 MMHG | HEART RATE: 87 BPM

## 2022-04-26 DIAGNOSIS — L98.9 SKIN LESION: ICD-10-CM

## 2022-04-26 DIAGNOSIS — Z12.83 SKIN CANCER SCREENING: ICD-10-CM

## 2022-04-26 PROCEDURE — 99213 OFFICE O/P EST LOW 20 MIN: CPT | Performed by: FAMILY MEDICINE

## 2022-04-26 RX ORDER — KETOCONAZOLE 20 MG/G
CREAM TOPICAL
Qty: 30 G | Refills: 0 | Status: SHIPPED | OUTPATIENT
Start: 2022-04-26 | End: 2022-05-16

## 2022-04-26 RX ORDER — DORZOLAMIDE HCL 20 MG/ML
SOLUTION/ DROPS OPHTHALMIC
COMMUNITY
Start: 2022-03-13

## 2022-04-26 NOTE — PROGRESS NOTES
"[facility]  Progress Note  Established Patient    Subjective:   Loli Pond is a 66 y.o. female here today with a chief complaint of skin lesion. The patient is alone.     Skin lesion  The patient describes a number of skin lesions she wanted me to evaluate.  The first is on the right dorsal hand.  A few weeks ago she developed a doughnut like skin lesion which is raised and then receded.  She also describes a right anterior thigh skin lesion which is reddish and rough.  This has been present for several years.  She also describes a midline back skin lesion that she cannot really see but can feel.  It does not really bother her.      Current Outpatient Medications on File Prior to Visit   Medication Sig Dispense Refill   • dorzolamide (TRUSOPT) 2 % Solution INSTILL 1 DROP INTO BOTH EYES TWICE A DAY       No current facility-administered medications on file prior to visit.          Objective:     Vitals:    04/26/22 0959   BP: 132/82   BP Location: Left arm   Patient Position: Sitting   BP Cuff Size: Adult   Pulse: 87   Resp: 16   Temp: 37 °C (98.6 °F)   TempSrc: Temporal   SpO2: 100%   Weight: 75.2 kg (165 lb 12.8 oz)   Height: 1.778 m (5' 10\")       Physical Exam:  General: alert in no apparent distress.   Skin: On the right dorsal hand there is slight residual excoriation but no persistent rash or skin lesion.  On the right anterior thigh there is an approximately 1.5 cm circumferential reddish, rough skin lesion.  On the midline back there is a brown, raised, rough skin lesion consistent with seborrheic keratosis.        Assessment and Plan:     1. Skin lesion  The patient's right dorsal hand skin lesion has essentially resolved but I advised her to use ketoconazole on it for 2 weeks in the event it is tinea.  The right anterior thigh skin lesion may be tinea corporis or nummular eczema.  Grovers disease is another consideration.  Does not seem consistent with cancer.  I recommended a trial " of ketoconazole for 2 weeks.  If the lesion persists after two weeks, on multiple occasions I asked the patient to let me know and we would try Lotrisone.  If it persists after this, we would consider biopsy. The back skin lesion is an SK. Will monitor. Not bothersome to patient so no cryo needed.  - ketoconazole (NIZORAL) 2 % Cream; Apply a thin layer to skin lesion daily x 2 weeks.  Dispense: 30 g; Refill: 0  - Referral to Dermatology    2. Skin cancer screening  - Referral to Dermatology        Followup: Return if symptoms worsen or fail to improve.

## 2022-04-26 NOTE — ASSESSMENT & PLAN NOTE
The patient describes a number of skin lesions she wanted me to evaluate.  The first is on the right dorsal hand.  A few weeks ago she developed a doughnut like skin lesion which is raised and then receded.  She also describes a right anterior thigh skin lesion which is reddish and rough.  This has been present for several years.  She also describes a midline back skin lesion that she cannot really see but can feel.  It does not really bother her.

## 2022-05-10 ENCOUNTER — PATIENT MESSAGE (OUTPATIENT)
Dept: MEDICAL GROUP | Facility: MEDICAL CENTER | Age: 67
End: 2022-05-10
Payer: COMMERCIAL

## 2022-05-10 RX ORDER — CLOTRIMAZOLE AND BETAMETHASONE DIPROPIONATE 10; .64 MG/G; MG/G
1 CREAM TOPICAL 2 TIMES DAILY
Qty: 45 G | Refills: 0 | Status: SHIPPED | OUTPATIENT
Start: 2022-05-10 | End: 2022-05-17

## 2022-05-16 ENCOUNTER — OFFICE VISIT (OUTPATIENT)
Dept: MEDICAL GROUP | Facility: MEDICAL CENTER | Age: 67
End: 2022-05-16
Payer: MEDICARE

## 2022-05-16 VITALS
HEIGHT: 70 IN | WEIGHT: 167.55 LBS | RESPIRATION RATE: 18 BRPM | SYSTOLIC BLOOD PRESSURE: 100 MMHG | BODY MASS INDEX: 23.99 KG/M2 | HEART RATE: 80 BPM | OXYGEN SATURATION: 99 % | DIASTOLIC BLOOD PRESSURE: 62 MMHG | TEMPERATURE: 97.6 F

## 2022-05-16 DIAGNOSIS — L98.9 SKIN LESION: ICD-10-CM

## 2022-05-16 DIAGNOSIS — R10.11 RIGHT UPPER QUADRANT ABDOMINAL PAIN: ICD-10-CM

## 2022-05-16 DIAGNOSIS — Z23 NEED FOR VACCINATION: ICD-10-CM

## 2022-05-16 DIAGNOSIS — N95.9 UNSPECIFIED MENOPAUSAL AND PERIMENOPAUSAL DISORDER: ICD-10-CM

## 2022-05-16 DIAGNOSIS — Z12.11 COLON CANCER SCREENING: ICD-10-CM

## 2022-05-16 PROBLEM — U07.1 COVID-19: Status: RESOLVED | Noted: 2022-01-12 | Resolved: 2022-05-16

## 2022-05-16 PROCEDURE — 99214 OFFICE O/P EST MOD 30 MIN: CPT | Performed by: FAMILY MEDICINE

## 2022-05-16 RX ORDER — BIMATOPROST 0.1 MG/ML
1 SOLUTION/ DROPS OPHTHALMIC
COMMUNITY
Start: 2022-05-11

## 2022-05-16 NOTE — PROGRESS NOTES
"[facility]  Progress Note  Established Patient    Subjective:   Loli Pond is a 66 y.o. female here today with a chief complaint of RUQ abdominal pain. The patient is alone.     Right upper quadrant abdominal pain  The patient describes RUQ abdominal pain since 4/28. It is constant but lessening in severity. There is no known trauma or injury. There is some fatigue but no CP, SOB, n/v/d. It is not postprandial. She have never had a cholecystectomy. The severity today is rated 4/10 in severity.     Skin lesion  Persists despite Lotrisone on R thigh. Has upcoming appt with derm in a few weeks.      Current Outpatient Medications on File Prior to Visit   Medication Sig Dispense Refill   • LUMIGAN 0.01 % Solution Administer 1 Drop into both eyes at bedtime. Instill 1 drop into both eyes every night at bedtime     • clotrimazole-betamethasone (LOTRISONE) 1-0.05 % Cream Apply 1 Application topically 2 times a day for 7 days. 45 g 0   • dorzolamide (TRUSOPT) 2 % Solution INSTILL 1 DROP INTO BOTH EYES TWICE A DAY       No current facility-administered medications on file prior to visit.          Objective:     Vitals:    05/16/22 0916   BP: 100/62   BP Location: Left arm   Patient Position: Sitting   BP Cuff Size: Adult long   Pulse: 80   Resp: 18   Temp: 36.4 °C (97.6 °F)   TempSrc: Temporal   SpO2: 99%   Weight: 76 kg (167 lb 8.8 oz)   Height: 1.778 m (5' 10\")       Physical Exam:  General: alert in no apparent distress.   Cardio: RRR on m/r/g.   Resp: CTAB.   Abd: soft, palpable liver but no palpable spleen, positive Slater's sign. Otherwise, no TTP. No rebound, no guarding.         Assessment and Plan:     1. Right upper quadrant abdominal pain  - CBC WITH DIFFERENTIAL; Future  - Comp Metabolic Panel; Future  - LIPASE; Future  - US-RUQ; Future    2. Unspecified menopausal and perimenopausal disorder  - DS-BONE DENSITY STUDY (DEXA); Future    3. Colon cancer screening  - Referral to " Gastroenterology    4. Need for vaccination  - recommended pneumonia and shingrix vaccines. Patient directed to pharmacy.     5. Skin lesion  - patient will f/u derm.     I informed the patient that I am leaving [facility] at the end of July 2022. I informed the patient that she should establish with a new doctor before then. It has been a privilege serving as this patient's family doctor and I wish her the best.  I informed the patient that if she needs to see me before I leave I'm available for appointments.       Followup: Return if symptoms worsen or fail to improve.

## 2022-05-16 NOTE — ASSESSMENT & PLAN NOTE
The patient describes RUQ abdominal pain since 4/28. It is constant but lessening in severity. There is no known trauma or injury. There is some fatigue but no CP, SOB, n/v/d. It is not postprandial. She have never had a cholecystectomy. The severity today is rated 4/10 in severity.

## 2022-05-17 ENCOUNTER — HOSPITAL ENCOUNTER (OUTPATIENT)
Dept: RADIOLOGY | Facility: MEDICAL CENTER | Age: 67
End: 2022-05-17
Attending: FAMILY MEDICINE
Payer: MEDICARE

## 2022-05-17 ENCOUNTER — HOSPITAL ENCOUNTER (OUTPATIENT)
Dept: LAB | Facility: MEDICAL CENTER | Age: 67
End: 2022-05-17
Attending: FAMILY MEDICINE
Payer: MEDICARE

## 2022-05-17 DIAGNOSIS — R10.11 RIGHT UPPER QUADRANT ABDOMINAL PAIN: ICD-10-CM

## 2022-05-17 LAB
ALBUMIN SERPL BCP-MCNC: 4.2 G/DL (ref 3.2–4.9)
ALBUMIN/GLOB SERPL: 1.6 G/DL
ALP SERPL-CCNC: 72 U/L (ref 30–99)
ALT SERPL-CCNC: 24 U/L (ref 2–50)
ANION GAP SERPL CALC-SCNC: 9 MMOL/L (ref 7–16)
AST SERPL-CCNC: 27 U/L (ref 12–45)
BASOPHILS # BLD AUTO: 0.9 % (ref 0–1.8)
BASOPHILS # BLD: 0.05 K/UL (ref 0–0.12)
BILIRUB SERPL-MCNC: 0.6 MG/DL (ref 0.1–1.5)
BUN SERPL-MCNC: 12 MG/DL (ref 8–22)
CALCIUM SERPL-MCNC: 8.9 MG/DL (ref 8.5–10.5)
CHLORIDE SERPL-SCNC: 105 MMOL/L (ref 96–112)
CO2 SERPL-SCNC: 24 MMOL/L (ref 20–33)
CREAT SERPL-MCNC: 0.68 MG/DL (ref 0.5–1.4)
EOSINOPHIL # BLD AUTO: 0.15 K/UL (ref 0–0.51)
EOSINOPHIL NFR BLD: 2.7 % (ref 0–6.9)
ERYTHROCYTE [DISTWIDTH] IN BLOOD BY AUTOMATED COUNT: 47.3 FL (ref 35.9–50)
GFR SERPLBLD CREATININE-BSD FMLA CKD-EPI: 96 ML/MIN/1.73 M 2
GLOBULIN SER CALC-MCNC: 2.6 G/DL (ref 1.9–3.5)
GLUCOSE SERPL-MCNC: 109 MG/DL (ref 65–99)
HCT VFR BLD AUTO: 44 % (ref 37–47)
HGB BLD-MCNC: 14.5 G/DL (ref 12–16)
IMM GRANULOCYTES # BLD AUTO: 0.01 K/UL (ref 0–0.11)
IMM GRANULOCYTES NFR BLD AUTO: 0.2 % (ref 0–0.9)
LIPASE SERPL-CCNC: 23 U/L (ref 11–82)
LYMPHOCYTES # BLD AUTO: 1.96 K/UL (ref 1–4.8)
LYMPHOCYTES NFR BLD: 35.3 % (ref 22–41)
MCH RBC QN AUTO: 31.7 PG (ref 27–33)
MCHC RBC AUTO-ENTMCNC: 33 G/DL (ref 33.6–35)
MCV RBC AUTO: 96.1 FL (ref 81.4–97.8)
MONOCYTES # BLD AUTO: 0.44 K/UL (ref 0–0.85)
MONOCYTES NFR BLD AUTO: 7.9 % (ref 0–13.4)
NEUTROPHILS # BLD AUTO: 2.94 K/UL (ref 2–7.15)
NEUTROPHILS NFR BLD: 53 % (ref 44–72)
NRBC # BLD AUTO: 0 K/UL
NRBC BLD-RTO: 0 /100 WBC
PLATELET # BLD AUTO: 253 K/UL (ref 164–446)
PMV BLD AUTO: 10.7 FL (ref 9–12.9)
POTASSIUM SERPL-SCNC: 4.1 MMOL/L (ref 3.6–5.5)
PROT SERPL-MCNC: 6.8 G/DL (ref 6–8.2)
RBC # BLD AUTO: 4.58 M/UL (ref 4.2–5.4)
SODIUM SERPL-SCNC: 138 MMOL/L (ref 135–145)
WBC # BLD AUTO: 5.6 K/UL (ref 4.8–10.8)

## 2022-05-17 PROCEDURE — 83690 ASSAY OF LIPASE: CPT

## 2022-05-17 PROCEDURE — 36415 COLL VENOUS BLD VENIPUNCTURE: CPT

## 2022-05-17 PROCEDURE — 76705 ECHO EXAM OF ABDOMEN: CPT

## 2022-05-17 PROCEDURE — 85025 COMPLETE CBC W/AUTO DIFF WBC: CPT

## 2022-05-17 PROCEDURE — 80053 COMPREHEN METABOLIC PANEL: CPT

## 2022-05-26 ENCOUNTER — HOSPITAL ENCOUNTER (OUTPATIENT)
Dept: RADIOLOGY | Facility: MEDICAL CENTER | Age: 67
End: 2022-05-26
Attending: FAMILY MEDICINE
Payer: MEDICARE

## 2022-05-26 DIAGNOSIS — N95.9 UNSPECIFIED MENOPAUSAL AND PERIMENOPAUSAL DISORDER: ICD-10-CM

## 2022-05-26 PROCEDURE — 77080 DXA BONE DENSITY AXIAL: CPT

## 2022-06-01 ENCOUNTER — APPOINTMENT (RX ONLY)
Dept: URBAN - METROPOLITAN AREA CLINIC 31 | Facility: CLINIC | Age: 67
Setting detail: DERMATOLOGY
End: 2022-06-01

## 2022-06-01 DIAGNOSIS — Z00.00 ENCOUNTER FOR GENERAL ADULT MEDICAL EXAMINATION WITHOUT ABNORMAL FINDINGS: ICD-10-CM

## 2022-06-01 DIAGNOSIS — Z71.89 OTHER SPECIFIED COUNSELING: ICD-10-CM

## 2022-06-01 DIAGNOSIS — L81.4 OTHER MELANIN HYPERPIGMENTATION: ICD-10-CM | Status: STABLE

## 2022-06-01 DIAGNOSIS — L82.1 OTHER SEBORRHEIC KERATOSIS: ICD-10-CM | Status: STABLE

## 2022-06-01 DIAGNOSIS — D18.0 HEMANGIOMA: ICD-10-CM | Status: STABLE

## 2022-06-01 DIAGNOSIS — D22 MELANOCYTIC NEVI: ICD-10-CM | Status: STABLE

## 2022-06-01 PROBLEM — D18.01 HEMANGIOMA OF SKIN AND SUBCUTANEOUS TISSUE: Status: ACTIVE | Noted: 2022-06-01

## 2022-06-01 PROBLEM — D22.5 MELANOCYTIC NEVI OF TRUNK: Status: ACTIVE | Noted: 2022-06-01

## 2022-06-01 PROBLEM — D48.5 NEOPLASM OF UNCERTAIN BEHAVIOR OF SKIN: Status: ACTIVE | Noted: 2022-06-01

## 2022-06-01 PROBLEM — Z71.1 PERSON WITH FEARED HEALTH COMPLAINT IN WHOM NO DIAGNOSIS IS MADE: Status: ACTIVE | Noted: 2022-06-01

## 2022-06-01 PROCEDURE — 11102 TANGNTL BX SKIN SINGLE LES: CPT

## 2022-06-01 PROCEDURE — ? BIOPSY BY SHAVE METHOD

## 2022-06-01 PROCEDURE — 99203 OFFICE O/P NEW LOW 30 MIN: CPT | Mod: 25

## 2022-06-01 PROCEDURE — ? COUNSELING

## 2022-06-01 ASSESSMENT — LOCATION DETAILED DESCRIPTION DERM
LOCATION DETAILED: RIGHT INFERIOR UPPER BACK
LOCATION DETAILED: RIGHT RADIAL DORSAL HAND
LOCATION DETAILED: RIGHT SUPERIOR LATERAL MIDBACK
LOCATION DETAILED: RIGHT SUPERIOR UPPER BACK

## 2022-06-01 ASSESSMENT — LOCATION SIMPLE DESCRIPTION DERM
LOCATION SIMPLE: RIGHT HAND
LOCATION SIMPLE: RIGHT LOWER BACK
LOCATION SIMPLE: RIGHT UPPER BACK

## 2022-06-01 ASSESSMENT — LOCATION ZONE DERM
LOCATION ZONE: HAND
LOCATION ZONE: TRUNK

## 2022-06-07 ENCOUNTER — APPOINTMENT (RX ONLY)
Dept: URBAN - METROPOLITAN AREA CLINIC 31 | Facility: CLINIC | Age: 67
Setting detail: DERMATOLOGY
End: 2022-06-07

## 2022-06-07 DIAGNOSIS — Z48.817 ENCOUNTER FOR SURGICAL AFTERCARE FOLLOWING SURGERY ON THE SKIN AND SUBCUTANEOUS TISSUE: ICD-10-CM

## 2022-06-07 PROCEDURE — ? PRESCRIPTION

## 2022-06-07 PROCEDURE — 99024 POSTOP FOLLOW-UP VISIT: CPT

## 2022-06-07 PROCEDURE — ? POST-OP WOUND CHECK

## 2022-06-07 RX ORDER — MUPIROCIN 20 MG/G
OINTMENT TOPICAL
Qty: 22 | Refills: 0 | Status: ERX | COMMUNITY
Start: 2022-06-07

## 2022-06-07 RX ADMIN — MUPIROCIN: 20 OINTMENT TOPICAL at 00:00

## 2022-06-07 ASSESSMENT — LOCATION DETAILED DESCRIPTION DERM: LOCATION DETAILED: RIGHT ANTERIOR PROXIMAL THIGH

## 2022-06-07 ASSESSMENT — LOCATION SIMPLE DESCRIPTION DERM: LOCATION SIMPLE: RIGHT THIGH

## 2022-06-07 ASSESSMENT — LOCATION ZONE DERM: LOCATION ZONE: LEG

## 2022-06-07 NOTE — PROCEDURE: POST-OP WOUND CHECK
Detail Level: Detailed
Add 74642 Cpt? (Important Note: In 2017 The Use Of 78873 Is Being Tracked By Cms To Determine Future Global Period Reimbursement For Global Periods): yes
Wound Assessment Override (Optional): clean, no signs on infection, non-tender, no erythema or edema
Patient

## 2022-11-09 ENCOUNTER — PATIENT MESSAGE (OUTPATIENT)
Dept: HEALTH INFORMATION MANAGEMENT | Facility: OTHER | Age: 67
End: 2022-11-09

## 2023-06-23 ENCOUNTER — APPOINTMENT (RX ONLY)
Dept: URBAN - METROPOLITAN AREA CLINIC 15 | Facility: CLINIC | Age: 68
Setting detail: DERMATOLOGY
End: 2023-06-23

## 2023-06-23 DIAGNOSIS — L82.1 OTHER SEBORRHEIC KERATOSIS: ICD-10-CM

## 2023-06-23 DIAGNOSIS — D18.0 HEMANGIOMA: ICD-10-CM

## 2023-06-23 DIAGNOSIS — I83.9 ASYMPTOMATIC VARICOSE VEINS OF LOWER EXTREMITIES: ICD-10-CM

## 2023-06-23 DIAGNOSIS — L57.0 ACTINIC KERATOSIS: ICD-10-CM

## 2023-06-23 DIAGNOSIS — L11.1 TRANSIENT ACANTHOLYTIC DERMATOSIS [GROVER]: ICD-10-CM

## 2023-06-23 DIAGNOSIS — D22 MELANOCYTIC NEVI: ICD-10-CM

## 2023-06-23 DIAGNOSIS — Z71.89 OTHER SPECIFIED COUNSELING: ICD-10-CM

## 2023-06-23 DIAGNOSIS — L44.8 OTHER SPECIFIED PAPULOSQUAMOUS DISORDERS: ICD-10-CM

## 2023-06-23 DIAGNOSIS — L81.4 OTHER MELANIN HYPERPIGMENTATION: ICD-10-CM

## 2023-06-23 PROBLEM — D22.71 MELANOCYTIC NEVI OF RIGHT LOWER LIMB, INCLUDING HIP: Status: ACTIVE | Noted: 2023-06-23

## 2023-06-23 PROBLEM — D18.01 HEMANGIOMA OF SKIN AND SUBCUTANEOUS TISSUE: Status: ACTIVE | Noted: 2023-06-23

## 2023-06-23 PROBLEM — I83.93 ASYMPTOMATIC VARICOSE VEINS OF BILATERAL LOWER EXTREMITIES: Status: ACTIVE | Noted: 2023-06-23

## 2023-06-23 PROCEDURE — ? COUNSELING

## 2023-06-23 PROCEDURE — ? PRESCRIPTION

## 2023-06-23 PROCEDURE — 99213 OFFICE O/P EST LOW 20 MIN: CPT

## 2023-06-23 PROCEDURE — ? ADDITIONAL NOTES

## 2023-06-23 PROCEDURE — ? MEDICATION COUNSELING

## 2023-06-23 RX ORDER — TRIAMCINOLONE ACETONIDE 1 MG/G
CREAM TOPICAL BID
Qty: 453.6 | Refills: 3 | Status: ERX | COMMUNITY
Start: 2023-06-23

## 2023-06-23 RX ADMIN — TRIAMCINOLONE ACETONIDE: 1 CREAM TOPICAL at 00:00

## 2023-06-23 ASSESSMENT — LOCATION SIMPLE DESCRIPTION DERM
LOCATION SIMPLE: NOSE
LOCATION SIMPLE: RIGHT UPPER ARM
LOCATION SIMPLE: RIGHT CALF
LOCATION SIMPLE: CHEST
LOCATION SIMPLE: UPPER BACK
LOCATION SIMPLE: RIGHT PRETIBIAL REGION
LOCATION SIMPLE: LEFT THIGH
LOCATION SIMPLE: ABDOMEN
LOCATION SIMPLE: LEFT FOREARM
LOCATION SIMPLE: LEFT UPPER ARM
LOCATION SIMPLE: RIGHT THIGH
LOCATION SIMPLE: LOWER BACK

## 2023-06-23 ASSESSMENT — LOCATION DETAILED DESCRIPTION DERM
LOCATION DETAILED: SUPERIOR LUMBAR SPINE
LOCATION DETAILED: LEFT LATERAL SUPERIOR CHEST
LOCATION DETAILED: LEFT ANTERIOR DISTAL UPPER ARM
LOCATION DETAILED: LEFT PROXIMAL DORSAL FOREARM
LOCATION DETAILED: LEFT ANTERIOR LATERAL DISTAL THIGH
LOCATION DETAILED: RIGHT MEDIAL INFERIOR CHEST
LOCATION DETAILED: RIGHT PROXIMAL PRETIBIAL REGION
LOCATION DETAILED: RIGHT PROXIMAL CALF
LOCATION DETAILED: NASAL DORSUM
LOCATION DETAILED: RIGHT ANTERIOR PROXIMAL THIGH
LOCATION DETAILED: RIGHT ANTERIOR PROXIMAL UPPER ARM
LOCATION DETAILED: RIGHT MEDIAL SUPERIOR CHEST
LOCATION DETAILED: RIGHT RIB CAGE
LOCATION DETAILED: INFERIOR THORACIC SPINE

## 2023-06-23 ASSESSMENT — LOCATION ZONE DERM
LOCATION ZONE: NOSE
LOCATION ZONE: LEG
LOCATION ZONE: TRUNK
LOCATION ZONE: ARM

## 2023-06-23 NOTE — PROCEDURE: COUNSELING
Detail Level: Generalized
Sunscreen Recommendation Label Override: Broad Spectrum Sunscreen minimum SPF 30+
Detail Level: Detailed
Prescription Strength Graduated Compression Stockings Recommendations: The patient was counseled that prescription strength graduated compression stockings should be worn for all waking hours. They will follow up with a venous specialist to monitor graduated compression stocking usage and their symptoms.
Detail Level: Zone

## 2023-06-23 NOTE — PROCEDURE: ADDITIONAL NOTES
Render Risk Assessment In Note?: no
Detail Level: Detailed
Additional Notes: Recommended patient take Zyrtec. \\nAdvised patient to use CeraVe moisturizer cream.
Additional Notes: Patient is going on a camping trip this weekend and would like to reschedule an appointment for LN2

## 2023-06-23 NOTE — PROCEDURE: MEDICATION COUNSELING
Topical Steroids Applications Pregnancy And Lactation Text: Most topical steroids are considered safe to use during pregnancy and lactation.  Any topical steroid applied to the breast or nipple should be washed off before breastfeeding.
Nsaids Pregnancy And Lactation Text: These medications are considered safe up to 30 weeks gestation. It is excreted in breast milk.
Doxycycline Pregnancy And Lactation Text: This medication is Pregnancy Category D and not consider safe during pregnancy. It is also excreted in breast milk but is considered safe for shorter treatment courses.
Hydroxyzine Counseling: Patient advised that the medication is sedating and not to drive a car after taking this medication.  Patient informed of potential adverse effects including but not limited to dry mouth, urinary retention, and blurry vision.  The patient verbalized understanding of the proper use and possible adverse effects of hydroxyzine.  All of the patient's questions and concerns were addressed.
Rifampin Counseling: I discussed with the patient the risks of rifampin including but not limited to liver damage, kidney damage, red-orange body fluids, nausea/vomiting and severe allergy.
Rinvoq Pregnancy And Lactation Text: Based on animal studies, Rinvoq may cause embryo-fetal harm when administered to pregnant women.  The medication should not be used in pregnancy.  Breastfeeding is not recommended during treatment and for 6 days after the last dose.
Hydroxychloroquine Counseling:  I discussed with the patient that a baseline ophthalmologic exam is needed at the start of therapy and every year thereafter while on therapy. A CBC may also be warranted for monitoring.  The side effects of this medication were discussed with the patient, including but not limited to agranulocytosis, aplastic anemia, seizures, rashes, retinopathy, and liver toxicity. Patient instructed to call the office should any adverse effect occur.  The patient verbalized understanding of the proper use and possible adverse effects of Plaquenil.  All the patient's questions and concerns were addressed.
Simponi Pregnancy And Lactation Text: The risk during pregnancy and breastfeeding is uncertain with this medication.
Isotretinoin Counseling: Patient should get monthly blood tests, not donate blood, not drive at night if vision affected, not share medication, and not undergo elective surgery for 6 months after tx completed. Side effects reviewed, pt to contact office should one occur.
Ilumya Counseling: I discussed with the patient the risks of tildrakizumab including but not limited to immunosuppression, malignancy, posterior leukoencephalopathy syndrome, and serious infections.  The patient understands that monitoring is required including a PPD at baseline and must alert us or the primary physician if symptoms of infection or other concerning signs are noted.
Use Enhanced Medication Counseling?: No
Tazorac Counseling:  Patient advised that medication is irritating and drying.  Patient may need to apply sparingly and wash off after an hour before eventually leaving it on overnight.  The patient verbalized understanding of the proper use and possible adverse effects of tazorac.  All of the patient's questions and concerns were addressed.
Clofazimine Pregnancy And Lactation Text: This medication is Pregnancy Category C and isn't considered safe during pregnancy. It is excreted in breast milk.
Qbrexza Pregnancy And Lactation Text: There is no available data on Qbrexza use in pregnant women.  There is no available data on Qbrexza use in lactation.
Xolair Pregnancy And Lactation Text: This medication is Pregnancy Category B and is considered safe during pregnancy. This medication is excreted in breast milk.
Elidel Counseling: Patient may experience a mild burning sensation during topical application. Elidel is not approved in children less than 2 years of age. There have been case reports of hematologic and skin malignancies in patients using topical calcineurin inhibitors although causality is questionable.
Zoryve Pregnancy And Lactation Text: It is unknown if this medication can cause problems during pregnancy and breastfeeding.
Sarecycline Pregnancy And Lactation Text: This medication is Pregnancy Category D and not consider safe during pregnancy. It is also excreted in breast milk.
Azithromycin Counseling:  I discussed with the patient the risks of azithromycin including but not limited to GI upset, allergic reaction, drug rash, diarrhea, and yeast infections.
Cimzia Pregnancy And Lactation Text: This medication crosses the placenta but can be considered safe in certain situations. Cimzia may be excreted in breast milk.
Methotrexate Pregnancy And Lactation Text: This medication is Pregnancy Category X and is known to cause fetal harm. This medication is excreted in breast milk.
Hydroxychloroquine Pregnancy And Lactation Text: This medication has been shown to cause fetal harm but it isn't assigned a Pregnancy Risk Category. There are small amounts excreted in breast milk.
Sotyktu Counseling:  I discussed the most common side effects of Sotyktu including: common cold, sore throat, sinus infections, cold sores, canker sores, folliculitis, and acne.? I also discussed more serious side effects of Sotyktu including but not limited to: serious allergic reactions; increased risk for infections such as TB; cancers such as lymphomas; rhabdomyolysis and elevated CPK; and elevated triglycerides and liver enzymes.?
Hydroxyzine Pregnancy And Lactation Text: This medication is not safe during pregnancy and should not be taken. It is also excreted in breast milk and breast feeding isn't recommended.
Ketoconazole Counseling:   Patient counseled regarding improving absorption with orange juice.  Adverse effects include but are not limited to breast enlargement, headache, diarrhea, nausea, upset stomach, liver function test abnormalities, taste disturbance, and stomach pain.  There is a rare possibility of liver failure that can occur when taking ketoconazole. The patient understands that monitoring of LFTs may be required, especially at baseline. The patient verbalized understanding of the proper use and possible adverse effects of ketoconazole.  All of the patient's questions and concerns were addressed.
Mirvaso Counseling: Mirvaso is a topical medication which can decrease superficial blood flow where applied. Side effects are uncommon and include stinging, redness and allergic reactions.
Olanzapine Counseling- I discussed with the patient the common side effects of olanzapine including but are not limited to: lack of energy, dry mouth, increased appetite, sleepiness, tremor, constipation, dizziness, changes in behavior, or restlessness.  Explained that teenagers are more likely to experience headaches, abdominal pain, pain in the arms or legs, tiredness, and sleepiness.  Serious side effects include but are not limited: increased risk of death in elderly patients who are confused, have memory loss, or dementia-related psychosis; hyperglycemia; increased cholesterol and triglycerides; and weight gain.
Isotretinoin Pregnancy And Lactation Text: This medication is Pregnancy Category X and is considered extremely dangerous during pregnancy. It is unknown if it is excreted in breast milk.
Azathioprine Counseling:  I discussed with the patient the risks of azathioprine including but not limited to myelosuppression, immunosuppression, hepatotoxicity, lymphoma, and infections.  The patient understands that monitoring is required including baseline LFTs, Creatinine, possible TPMP genotyping and weekly CBCs for the first month and then every 2 weeks thereafter.  The patient verbalized understanding of the proper use and possible adverse effects of azathioprine.  All of the patient's questions and concerns were addressed.
Benzoyl Peroxide Pregnancy And Lactation Text: This medication is Pregnancy Category C. It is unknown if benzoyl peroxide is excreted in breast milk.
Propranolol Pregnancy And Lactation Text: This medication is Pregnancy Category C and it isn't known if it is safe during pregnancy. It is excreted in breast milk.
Rifampin Pregnancy And Lactation Text: This medication is Pregnancy Category C and it isn't know if it is safe during pregnancy. It is also excreted in breast milk and should not be used if you are breast feeding.
Topical Sulfur Applications Counseling: Topical Sulfur Counseling: Patient counseled that this medication may cause skin irritation or allergic reactions.  In the event of skin irritation, the patient was advised to reduce the amount of the drug applied or use it less frequently.   The patient verbalized understanding of the proper use and possible adverse effects of topical sulfur application.  All of the patient's questions and concerns were addressed.
Tazorac Pregnancy And Lactation Text: This medication is not safe during pregnancy. It is unknown if this medication is excreted in breast milk.
Erythromycin Counseling:  I discussed with the patient the risks of erythromycin including but not limited to GI upset, allergic reaction, drug rash, diarrhea, increase in liver enzymes, and yeast infections.
Skyrizi Counseling: I discussed with the patient the risks of risankizumab-rzaa including but not limited to immunosuppression, and serious infections.  The patient understands that monitoring is required including a PPD at baseline and must alert us or the primary physician if symptoms of infection or other concerning signs are noted.
Colchicine Counseling:  Patient counseled regarding adverse effects including but not limited to stomach upset (nausea, vomiting, stomach pain, or diarrhea).  Patient instructed to limit alcohol consumption while taking this medication.  Colchicine may reduce blood counts especially with prolonged use.  The patient understands that monitoring of kidney function and blood counts may be required, especially at baseline. The patient verbalized understanding of the proper use and possible adverse effects of colchicine.  All of the patient's questions and concerns were addressed.
Tetracycline Counseling: Patient counseled regarding possible photosensitivity and increased risk for sunburn.  Patient instructed to avoid sunlight, if possible.  When exposed to sunlight, patients should wear protective clothing, sunglasses, and sunscreen.  The patient was instructed to call the office immediately if the following severe adverse effects occur:  hearing changes, easy bruising/bleeding, severe headache, or vision changes.  The patient verbalized understanding of the proper use and possible adverse effects of tetracycline.  All of the patient's questions and concerns were addressed. Patient understands to avoid pregnancy while on therapy due to potential birth defects.
Prednisone Counseling:  I discussed with the patient the risks of prolonged use of prednisone including but not limited to weight gain, insomnia, osteoporosis, mood changes, diabetes, susceptibility to infection, glaucoma and high blood pressure.  In cases where prednisone use is prolonged, patients should be monitored with blood pressure checks, serum glucose levels and an eye exam.  Additionally, the patient may need to be placed on GI prophylaxis, PCP prophylaxis, and calcium and vitamin D supplementation and/or a bisphosphonate.  The patient verbalized understanding of the proper use and the possible adverse effects of prednisone.  All of the patient's questions and concerns were addressed.
Elidel Pregnancy And Lactation Text: This medication is Pregnancy Category C. It is unknown if this medication is excreted in breast milk.
Azithromycin Pregnancy And Lactation Text: This medication is considered safe during pregnancy and is also secreted in breast milk.
Rhofade Counseling: Rhofade is a topical medication which can decrease superficial blood flow where applied. Side effects are uncommon and include stinging, redness and allergic reactions.
Zyclara Counseling:  I discussed with the patient the risks of imiquimod including but not limited to erythema, scaling, itching, weeping, crusting, and pain.  Patient understands that the inflammatory response to imiquimod is variable from person to person and was educated regarded proper titration schedule.  If flu-like symptoms develop, patient knows to discontinue the medication and contact us.
Mirvaso Pregnancy And Lactation Text: This medication has not been assigned a Pregnancy Risk Category. It is unknown if the medication is excreted in breast milk.
SSKI Counseling:  I discussed with the patient the risks of SSKI including but not limited to thyroid abnormalities, metallic taste, GI upset, fever, headache, acne, arthralgias, paraesthesias, lymphadenopathy, easy bleeding, arrhythmias, and allergic reaction.
Ketoconazole Pregnancy And Lactation Text: This medication is Pregnancy Category C and it isn't know if it is safe during pregnancy. It is also excreted in breast milk and breast feeding isn't recommended.
Sotyktu Pregnancy And Lactation Text: There is insufficient data to evaluate whether or not Sotyktu is safe to use during pregnancy.? ?It is not known if Sotyktu passes into breast milk and whether or not it is safe to use when breastfeeding.??
Cosentyx Counseling:  I discussed with the patient the risks of Cosentyx including but not limited to worsening of Crohn's disease, immunosuppression, allergic reactions and infections.  The patient understands that monitoring is required including a PPD at baseline and must alert us or the primary physician if symptoms of infection or other concerning signs are noted.
Low Dose Naltrexone Counseling- I discussed with the patient the potential risks and side effects of low dose naltrexone including but not limited to: more vivid dreams, headaches, nausea, vomiting, abdominal pain, fatigue, dizziness, and anxiety.
Sarecycline Counseling: Patient advised regarding possible photosensitivity and discoloration of the teeth, skin, lips, tongue and gums.  Patient instructed to avoid sunlight, if possible.  When exposed to sunlight, patients should wear protective clothing, sunglasses, and sunscreen.  The patient was instructed to call the office immediately if the following severe adverse effects occur:  hearing changes, easy bruising/bleeding, severe headache, or vision changes.  The patient verbalized understanding of the proper use and possible adverse effects of sarecycline.  All of the patient's questions and concerns were addressed.
Dutasteride Male Counseling: Dustasteride Counseling:  I discussed with the patient the risks of use of dutasteride including but not limited to decreased libido, decreased ejaculate volume, and gynecomastia. Women who can become pregnant should not handle medication.  All of the patient's questions and concerns were addressed.
Carac Counseling:  I discussed with the patient the risks of Carac including but not limited to erythema, scaling, itching, weeping, crusting, and pain.
Erivedge Counseling- I discussed with the patient the risks of Erivedge including but not limited to nausea, vomiting, diarrhea, constipation, weight loss, changes in the sense of taste, decreased appetite, muscle spasms, and hair loss.  The patient verbalized understanding of the proper use and possible adverse effects of Erivedge.  All of the patient's questions and concerns were addressed.
Topical Sulfur Applications Pregnancy And Lactation Text: This medication is considered safe during pregnancy and breast feeding secondary to limited systemic absorption.
High Dose Vitamin A Counseling: Side effects reviewed, pt to contact office should one occur.
Olanzapine Pregnancy And Lactation Text: This medication is pregnancy category C.   There are no adequate and well controlled trials with olanzapine in pregnant females.  Olanzapine should be used during pregnancy only if the potential benefit justifies the potential risk to the fetus.   In a study in lactating healthy women, olanzapine was excreted in breast milk.  It is recommended that women taking olanzapine should not breast feed.
Azathioprine Pregnancy And Lactation Text: This medication is Pregnancy Category D and isn't considered safe during pregnancy. It is unknown if this medication is excreted in breast milk.
Topical Clindamycin Counseling: Patient counseled that this medication may cause skin irritation or allergic reactions.  In the event of skin irritation, the patient was advised to reduce the amount of the drug applied or use it less frequently.   The patient verbalized understanding of the proper use and possible adverse effects of clindamycin.  All of the patient's questions and concerns were addressed.
Bactrim Counseling:  I discussed with the patient the risks of sulfa antibiotics including but not limited to GI upset, allergic reaction, drug rash, diarrhea, dizziness, photosensitivity, and yeast infections.  Rarely, more serious reactions can occur including but not limited to aplastic anemia, agranulocytosis, methemoglobinemia, blood dyscrasias, liver or kidney failure, lung infiltrates or desquamative/blistering drug rashes.
Erythromycin Pregnancy And Lactation Text: This medication is Pregnancy Category B and is considered safe during pregnancy. It is also excreted in breast milk.
Infliximab Counseling:  I discussed with the patient the risks of infliximab including but not limited to myelosuppression, immunosuppression, autoimmune hepatitis, demyelinating diseases, lymphoma, and serious infections.  The patient understands that monitoring is required including a PPD at baseline and must alert us or the primary physician if symptoms of infection or other concerning signs are noted.
Cosentyx Pregnancy And Lactation Text: This medication is Pregnancy Category B and is considered safe during pregnancy. It is unknown if this medication is excreted in breast milk.
Sski Pregnancy And Lactation Text: This medication is Pregnancy Category D and isn't considered safe during pregnancy. It is excreted in breast milk.
Carac Pregnancy And Lactation Text: This medication is Pregnancy Category X and contraindicated in pregnancy and in women who may become pregnant. It is unknown if this medication is excreted in breast milk.
Prednisone Pregnancy And Lactation Text: This medication is Pregnancy Category C and it isn't know if it is safe during pregnancy. This medication is excreted in breast milk.
Albendazole Counseling:  I discussed with the patient the risks of albendazole including but not limited to cytopenia, kidney damage, nausea/vomiting and severe allergy.  The patient understands that this medication is being used in an off-label manner.
Eucrisa Counseling: Patient may experience a mild burning sensation during topical application. Eucrisa is not approved in children less than 3 months of age.
Terbinafine Counseling: Patient counseling regarding adverse effects of terbinafine including but not limited to headache, diarrhea, rash, upset stomach, liver function test abnormalities, itching, taste/smell disturbance, nausea, abdominal pain, and flatulence.  There is a rare possibility of liver failure that can occur when taking terbinafine.  The patient understands that a baseline LFT and kidney function test may be required. The patient verbalized understanding of the proper use and possible adverse effects of terbinafine.  All of the patient's questions and concerns were addressed.
Cellcept Counseling:  I discussed with the patient the risks of mycophenolate mofetil including but not limited to infection/immunosuppression, GI upset, hypokalemia, hypercholesterolemia, bone marrow suppression, lymphoproliferative disorders, malignancy, GI ulceration/bleed/perforation, colitis, interstitial lung disease, kidney failure, progressive multifocal leukoencephalopathy, and birth defects.  The patient understands that monitoring is required including a baseline creatinine and regular CBC testing. In addition, patient must alert us immediately if symptoms of infection or other concerning signs are noted.
Wartpeel Counseling:  I discussed with the patient the risks of Wartpeel including but not limited to erythema, scaling, itching, weeping, crusting, and pain.
Opzelura Counseling:  I discussed with the patient the risks of Opzelura including but not limited to nasopharngitis, bronchitis, ear infection, eosinophila, hives, diarrhea, folliculitis, tonsillitis, and rhinorrhea.  Taken orally, this medication has been linked to serious infections; higher rate of mortality; malignancy and lymphoproliferative disorders; major adverse cardiovascular events; thrombosis; thrombocytopenia, anemia, and neutropenia; and lipid elevations.
Low Dose Naltrexone Pregnancy And Lactation Text: Naltrexone is pregnancy category C.  There have been no adequate and well-controlled studies in pregnant women.  It should be used in pregnancy only if the potential benefit justifies the potential risk to the fetus.   Limited data indicates that naltrexone is minimally excreted into breastmilk.
Xeljanz Counseling: I discussed with the patient the risks of Xeljanz therapy including increased risk of infection, liver issues, headache, diarrhea, or cold symptoms. Live vaccines should be avoided. They were instructed to call if they have any problems.
Dutasteride Pregnancy And Lactation Text: This medication is absolutely contraindicated in women, especially during pregnancy and breast feeding. Feminization of male fetuses is possible if taking while pregnant.
Stelara Counseling:  I discussed with the patient the risks of ustekinumab including but not limited to immunosuppression, malignancy, posterior leukoencephalopathy syndrome, and serious infections.  The patient understands that monitoring is required including a PPD at baseline and must alert us or the primary physician if symptoms of infection or other concerning signs are noted.
Oral Minoxidil Counseling- I discussed with the patient the risks of oral minoxidil including but not limited to shortness of breath, swelling of the feet or ankles, dizziness, lightheadedness, unwanted hair growth and allergic reaction.  The patient verbalized understanding of the proper use and possible adverse effects of oral minoxidil.  All of the patient's questions and concerns were addressed.
Erivedge Pregnancy And Lactation Text: This medication is Pregnancy Category X and is absolutely contraindicated during pregnancy. It is unknown if it is excreted in breast milk.
High Dose Vitamin A Pregnancy And Lactation Text: High dose vitamin A therapy is contraindicated during pregnancy and breast feeding.
Metronidazole Counseling:  I discussed with the patient the risks of metronidazole including but not limited to seizures, nausea/vomiting, a metallic taste in the mouth, nausea/vomiting and severe allergy.
Topical Clindamycin Pregnancy And Lactation Text: This medication is Pregnancy Category B and is considered safe during pregnancy. It is unknown if it is excreted in breast milk.
Dapsone Counseling: I discussed with the patient the risks of dapsone including but not limited to hemolytic anemia, agranulocytosis, rashes, methemoglobinemia, kidney failure, peripheral neuropathy, headaches, GI upset, and liver toxicity.  Patients who start dapsone require monitoring including baseline LFTs and weekly CBCs for the first month, then every month thereafter.  The patient verbalized understanding of the proper use and possible adverse effects of dapsone.  All of the patient's questions and concerns were addressed.
Albendazole Pregnancy And Lactation Text: This medication is Pregnancy Category C and it isn't known if it is safe during pregnancy. It is also excreted in breast milk.
Solaraze Counseling:  I discussed with the patient the risks of Solaraze including but not limited to erythema, scaling, itching, weeping, crusting, and pain.
Bactrim Pregnancy And Lactation Text: This medication is Pregnancy Category D and is known to cause fetal risk.  It is also excreted in breast milk.
Dupixent Counseling: I discussed with the patient the risks of dupilumab including but not limited to eye infection and irritation, cold sores, injection site reactions, worsening of asthma, allergic reactions and increased risk of parasitic infection.  Live vaccines should be avoided while taking dupilumab. Dupilumab will also interact with certain medications such as warfarin and cyclosporine. The patient understands that monitoring is required and they must alert us or the primary physician if symptoms of infection or other concerning signs are noted.
Eucrisa Pregnancy And Lactation Text: This medication has not been assigned a Pregnancy Risk Category but animal studies failed to show danger with the topical medication. It is unknown if the medication is excreted in breast milk.
Finasteride Male Counseling: Finasteride Counseling:  I discussed with the patient the risks of use of finasteride including but not limited to decreased libido, decreased ejaculate volume, gynecomastia, and depression. Women should not handle medication.  All of the patient's questions and concerns were addressed.
Terbinafine Pregnancy And Lactation Text: This medication is Pregnancy Category B and is considered safe during pregnancy. It is also excreted in breast milk and breast feeding isn't recommended.
Opzelura Pregnancy And Lactation Text: There is insufficient data to evaluate drug-associated risk for major birth defects, miscarriage, or other adverse maternal or fetal outcomes.  There is a pregnancy registry that monitors pregnancy outcomes in pregnant persons exposed to the medication during pregnancy.  It is unknown if this medication is excreted in breast milk.  Do not breastfeed during treatment and for about 4 weeks after the last dose.
Thalidomide Counseling: I discussed with the patient the risks of thalidomide including but not limited to birth defects, anxiety, weakness, chest pain, dizziness, cough and severe allergy.
Calcipotriene Counseling:  I discussed with the patient the risks of calcipotriene including but not limited to erythema, scaling, itching, and irritation.
Xellukaszz Pregnancy And Lactation Text: This medication is Pregnancy Category D and is not considered safe during pregnancy.  The risk during breast feeding is also uncertain.
Oral Minoxidil Pregnancy And Lactation Text: This medication should only be used when clearly needed if you are pregnant, attempting to become pregnant or breast feeding.
Calcipotriene Pregnancy And Lactation Text: The use of this medication during pregnancy or lactation is not recommended as there is insufficient data.
Fluconazole Counseling:  Patient counseled regarding adverse effects of fluconazole including but not limited to headache, diarrhea, nausea, upset stomach, liver function test abnormalities, taste disturbance, and stomach pain.  There is a rare possibility of liver failure that can occur when taking fluconazole.  The patient understands that monitoring of LFTs and kidney function test may be required, especially at baseline. The patient verbalized understanding of the proper use and possible adverse effects of fluconazole.  All of the patient's questions and concerns were addressed.
Cibinqo Counseling: I discussed with the patient the risks of Cibinqo therapy including but not limited to common cold, nausea, headache, cold sores, increased blood CPK levels, dizziness, UTIs, fatigue, acne, and vomitting. Live vaccines should be avoided.  This medication has been linked to serious infections; higher rate of mortality; malignancy and lymphoproliferative disorders; major adverse cardiovascular events; thrombosis; thrombocytopenia and lymphopenia; lipid elevations; and retinal detachment.
Metronidazole Pregnancy And Lactation Text: This medication is Pregnancy Category B and considered safe during pregnancy.  It is also excreted in breast milk.
Libtayo Counseling- I discussed with the patient the risks of Libtayo including but not limited to nausea, vomiting, diarrhea, and bone or muscle pain.  The patient verbalized understanding of the proper use and possible adverse effects of Libtayo.  All of the patient's questions and concerns were addressed.
Dapsone Pregnancy And Lactation Text: This medication is Pregnancy Category C and is not considered safe during pregnancy or breast feeding.
Niacinamide Counseling: I recommended taking niacin or niacinamide, also know as vitamin B3, twice daily. Recent evidence suggests that taking vitamin B3 (500 mg twice daily) can reduce the risk of actinic keratoses and non-melanoma skin cancers. Side effects of vitamin B3 include flushing and headache.
Opioid Counseling: I discussed with the patient the potential side effects of opioids including but not limited to addiction, altered mental status, and depression. I stressed avoiding alcohol, benzodiazepines, muscle relaxants and sleep aids unless specifically okayed by a physician. The patient verbalized understanding of the proper use and possible adverse effects of opioids. All of the patient's questions and concerns were addressed. They were instructed to flush the remaining pills down the toilet if they did not need them for pain.
Rituxan Counseling:  I discussed with the patient the risks of Rituxan infusions. Side effects can include infusion reactions, severe drug rashes including mucocutaneous reactions, reactivation of latent hepatitis and other infections and rarely progressive multifocal leukoencephalopathy.  All of the patient's questions and concerns were addressed.
Topical Ketoconazole Counseling: Patient counseled that this medication may cause skin irritation or allergic reactions.  In the event of skin irritation, the patient was advised to reduce the amount of the drug applied or use it less frequently.   The patient verbalized understanding of the proper use and possible adverse effects of ketoconazole.  All of the patient's questions and concerns were addressed.
Hydroquinone Counseling:  Patient advised that medication may result in skin irritation, lightening (hypopigmentation), dryness, and burning.  In the event of skin irritation, the patient was advised to reduce the amount of the drug applied or use it less frequently.  Rarely, spots that are treated with hydroquinone can become darker (pseudoochronosis).  Should this occur, patient instructed to stop medication and call the office. The patient verbalized understanding of the proper use and possible adverse effects of hydroquinone.  All of the patient's questions and concerns were addressed.
Solaraze Pregnancy And Lactation Text: This medication is Pregnancy Category B and is considered safe. There is some data to suggest avoiding during the third trimester. It is unknown if this medication is excreted in breast milk.
Ivermectin Counseling:  Patient instructed to take medication on an empty stomach with a full glass of water.  Patient informed of potential adverse effects including but not limited to nausea, diarrhea, dizziness, itching, and swelling of the extremities or lymph nodes.  The patient verbalized understanding of the proper use and possible adverse effects of ivermectin.  All of the patient's questions and concerns were addressed.
Finasteride Pregnancy And Lactation Text: This medication is absolutely contraindicated during pregnancy. It is unknown if it is excreted in breast milk.
Winlevi Counseling:  I discussed with the patient the risks of topical clascoterone including but not limited to erythema, scaling, itching, and stinging. Patient voiced their understanding.
Cephalexin Counseling: I counseled the patient regarding use of cephalexin as an antibiotic for prophylactic and/or therapeutic purposes. Cephalexin (commonly prescribed under brand name Keflex) is a cephalosporin antibiotic which is active against numerous classes of bacteria, including most skin bacteria. Side effects may include nausea, diarrhea, gastrointestinal upset, rash, hives, yeast infections, and in rare cases, hepatitis, kidney disease, seizures, fever, confusion, neurologic symptoms, and others. Patients with severe allergies to penicillin medications are cautioned that there is about a 10% incidence of cross-reactivity with cephalosporins. When possible, patients with penicillin allergies should use alternatives to cephalosporins for antibiotic therapy.
Dupixent Pregnancy And Lactation Text: This medication likely crosses the placenta but the risk for the fetus is uncertain. This medication is excreted in breast milk.
Niacinamide Pregnancy And Lactation Text: These medications are considered safe during pregnancy.
Taltz Counseling: I discussed with the patient the risks of ixekizumab including but not limited to immunosuppression, serious infections, worsening of inflammatory bowel disease and drug reactions.  The patient understands that monitoring is required including a PPD at baseline and must alert us or the primary physician if symptoms of infection or other concerning signs are noted.
Picato Counseling:  I discussed with the patient the risks of Picato including but not limited to erythema, scaling, itching, weeping, crusting, and pain.
Otezla Counseling: The side effects of Otezla were discussed with the patient, including but not limited to worsening or new depression, weight loss, diarrhea, nausea, upper respiratory tract infection, and headache. Patient instructed to call the office should any adverse effect occur.  The patient verbalized understanding of the proper use and possible adverse effects of Otezla.  All the patient's questions and concerns were addressed.
Aklief counseling:  Patient advised to apply a pea-sized amount only at bedtime and wait 30 minutes after washing their face before applying.  If too drying, patient may add a non-comedogenic moisturizer.  The most commonly reported side effects including irritation, redness, scaling, dryness, stinging, burning, itching, and increased risk of sunburn.  The patient verbalized understanding of the proper use and possible adverse effects of retinoids.  All of the patient's questions and concerns were addressed.
Cyclophosphamide Counseling:  I discussed with the patient the risks of cyclophosphamide including but not limited to hair loss, hormonal abnormalities, decreased fertility, abdominal pain, diarrhea, nausea and vomiting, bone marrow suppression and infection. The patient understands that monitoring is required while taking this medication.
Cantharidin Counseling:  I discussed with the patient the risks of Cantharidin including but not limited to pain, redness, burning, itching, and blistering.
Opioid Pregnancy And Lactation Text: These medications can lead to premature delivery and should be avoided during pregnancy. These medications are also present in breast milk in small amounts.
Fluconazole Pregnancy And Lactation Text: This medication is Pregnancy Category C and it isn't know if it is safe during pregnancy. It is also excreted in breast milk.
Cantharidin Pregnancy And Lactation Text: This medication has not been proven safe during pregnancy. It is unknown if this medication is excreted in breast milk.
Minocycline Counseling: Patient advised regarding possible photosensitivity and discoloration of the teeth, skin, lips, tongue and gums.  Patient instructed to avoid sunlight, if possible.  When exposed to sunlight, patients should wear protective clothing, sunglasses, and sunscreen.  The patient was instructed to call the office immediately if the following severe adverse effects occur:  hearing changes, easy bruising/bleeding, severe headache, or vision changes.  The patient verbalized understanding of the proper use and possible adverse effects of minocycline.  All of the patient's questions and concerns were addressed.
Cimetidine Counseling:  I discussed with the patient the risks of Cimetidine including but not limited to gynecomastia, headache, diarrhea, nausea, drowsiness, arrhythmias, pancreatitis, skin rashes, psychosis, bone marrow suppression and kidney toxicity.
Cibinqo Pregnancy And Lactation Text: It is unknown if this medication will adversely affect pregnancy or breast feeding.  You should not take this medication if you are currently pregnant or planning a pregnancy or while breastfeeding.
Gabapentin Counseling: I discussed with the patient the risks of gabapentin including but not limited to dizziness, somnolence, fatigue and ataxia.
Libtayo Pregnancy And Lactation Text: This medication is contraindicated in pregnancy and when breast feeding.
Rituxan Pregnancy And Lactation Text: This medication is Pregnancy Category C and it isn't know if it is safe during pregnancy. It is unknown if this medication is excreted in breast milk but similar antibodies are known to be excreted.
Cephalexin Pregnancy And Lactation Text: This medication is Pregnancy Category B and considered safe during pregnancy.  It is also excreted in breast milk but can be used safely for shorter doses.
Tranexamic Acid Counseling:  Patient advised of the small risk of bleeding problems with tranexamic acid. They were also instructed to call if they developed any nausea, vomiting or diarrhea. All of the patient's questions and concerns were addressed.
Acitretin Counseling:  I discussed with the patient the risks of acitretin including but not limited to hair loss, dry lips/skin/eyes, liver damage, hyperlipidemia, depression/suicidal ideation, photosensitivity.  Serious rare side effects can include but are not limited to pancreatitis, pseudotumor cerebri, bony changes, clot formation/stroke/heart attack.  Patient understands that alcohol is contraindicated since it can result in liver toxicity and significantly prolong the elimination of the drug by many years.
Soolantra Counseling: I discussed with the patients the risks of topial Soolantra. This is a medicine which decreases the number of mites and inflammation in the skin. You experience burning, stinging, eye irritation or allergic reactions.  Please call our office if you develop any problems from using this medication.
Winlevi Pregnancy And Lactation Text: This medication is considered safe during pregnancy and breastfeeding.
Enbrel Counseling:  I discussed with the patient the risks of etanercept including but not limited to myelosuppression, immunosuppression, autoimmune hepatitis, demyelinating diseases, lymphoma, and infections.  The patient understands that monitoring is required including a PPD at baseline and must alert us or the primary physician if symptoms of infection or other concerning signs are noted.
Detail Level: Simple
Birth Control Pills Counseling: Birth Control Pill Counseling: I discussed with the patient the potential side effects of OCPs including but not limited to increased risk of stroke, heart attack, thrombophlebitis, deep venous thrombosis, hepatic adenomas, breast changes, GI upset, headaches, and depression.  The patient verbalized understanding of the proper use and possible adverse effects of OCPs. All of the patient's questions and concerns were addressed.
5-Fu Counseling: 5-Fluorouracil Counseling:  I discussed with the patient the risks of 5-fluorouracil including but not limited to erythema, scaling, itching, weeping, crusting, and pain.
Odomzo Counseling- I discussed with the patient the risks of Odomzo including but not limited to nausea, vomiting, diarrhea, constipation, weight loss, changes in the sense of taste, decreased appetite, muscle spasms, and hair loss.  The patient verbalized understanding of the proper use and possible adverse effects of Odomzo.  All of the patient's questions and concerns were addressed.
Griseofulvin Counseling:  I discussed with the patient the risks of griseofulvin including but not limited to photosensitivity, cytopenia, liver damage, nausea/vomiting and severe allergy.  The patient understands that this medication is best absorbed when taken with a fatty meal (e.g., ice cream or french fries).
Siliq Counseling:  I discussed with the patient the risks of Siliq including but not limited to new or worsening depression, suicidal thoughts and behavior, immunosuppression, malignancy, posterior leukoencephalopathy syndrome, and serious infections.  The patient understands that monitoring is required including a PPD at baseline and must alert us or the primary physician if symptoms of infection or other concerning signs are noted. There is also a special program designed to monitor depression which is required with Siliq.
Otezla Pregnancy And Lactation Text: This medication is Pregnancy Category C and it isn't known if it is safe during pregnancy. It is unknown if it is excreted in breast milk.
Cyclophosphamide Pregnancy And Lactation Text: This medication is Pregnancy Category D and it isn't considered safe during pregnancy. This medication is excreted in breast milk.
Aklief Pregnancy And Lactation Text: It is unknown if this medication is safe to use during pregnancy.  It is unknown if this medication is excreted in breast milk.  Breastfeeding women should use the topical cream on the smallest area of the skin for the shortest time needed while breastfeeding.  Do not apply to nipple and areola.
Topical Metronidazole Counseling: Metronidazole is a topical antibiotic medication. You may experience burning, stinging, redness, or allergic reactions.  Please call our office if you develop any problems from using this medication.
Olumiant Counseling: I discussed with the patient the risks of Olumiant therapy including but not limited to upper respiratory tract infections, shingles, cold sores, and nausea. Live vaccines should be avoided.  This medication has been linked to serious infections; higher rate of mortality; malignancy and lymphoproliferative disorders; major adverse cardiovascular events; thrombosis; gastrointestinal perforations; neutropenia; lymphopenia; anemia; liver enzyme elevations; and lipid elevations.
Acitretin Pregnancy And Lactation Text: This medication is Pregnancy Category X and should not be given to women who are pregnant or may become pregnant in the future. This medication is excreted in breast milk.
Clindamycin Counseling: I counseled the patient regarding use of clindamycin as an antibiotic for prophylactic and/or therapeutic purposes. Clindamycin is active against numerous classes of bacteria, including skin bacteria. Side effects may include nausea, diarrhea, gastrointestinal upset, rash, hives, yeast infections, and in rare cases, colitis.
Arava Counseling:  Patient counseled regarding adverse effects of Arava including but not limited to nausea, vomiting, abnormalities in liver function tests. Patients may develop mouth sores, rash, diarrhea, and abnormalities in blood counts. The patient understands that monitoring is required including LFTs and blood counts.  There is a rare possibility of scarring of the liver and lung problems that can occur when taking methotrexate. Persistent nausea, loss of appetite, pale stools, dark urine, cough, and shortness of breath should be reported immediately. Patient advised to discontinue Arava treatment and consult with a physician prior to attempting conception. The patient will have to undergo a treatment to eliminate Arava from the body prior to conception.
Imiquimod Counseling:  I discussed with the patient the risks of imiquimod including but not limited to erythema, scaling, itching, weeping, crusting, and pain.  Patient understands that the inflammatory response to imiquimod is variable from person to person and was educated regarded proper titration schedule.  If flu-like symptoms develop, patient knows to discontinue the medication and contact us.
Birth Control Pills Pregnancy And Lactation Text: This medication should be avoided if pregnant and for the first 30 days post-partum.
Tranexamic Acid Pregnancy And Lactation Text: It is unknown if this medication is safe during pregnancy or breast feeding.
Soolantra Pregnancy And Lactation Text: This medication is Pregnancy Category C. This medication is considered safe during breast feeding.
Protopic Counseling: Patient may experience a mild burning sensation during topical application. Protopic is not approved in children less than 2 years of age. There have been case reports of hematologic and skin malignancies in patients using topical calcineurin inhibitors although causality is questionable.
Cyclosporine Counseling:  I discussed with the patient the risks of cyclosporine including but not limited to hypertension, gingival hyperplasia,myelosuppression, immunosuppression, liver damage, kidney damage, neurotoxicity, lymphoma, and serious infections. The patient understands that monitoring is required including baseline blood pressure, CBC, CMP, lipid panel and uric acid, and then 1-2 times monthly CMP and blood pressure.
VTAMA Counseling: I discussed with the patient that VTAMA is not for use in the eyes, mouth or mouth. They should call the office if they develop any signs of allergic reactions to VTAMA. The patient verbalized understanding of the proper use and possible adverse effects of VTAMA.  All of the patient's questions and concerns were addressed.
Griseofulvin Pregnancy And Lactation Text: This medication is Pregnancy Category X and is known to cause serious birth defects. It is unknown if this medication is excreted in breast milk but breast feeding should be avoided.
Olumiant Pregnancy And Lactation Text: Based on animal studies, Olumiant may cause embryo-fetal harm when administered to pregnant women.  The medication should not be used in pregnancy.  Breastfeeding is not recommended during treatment.
Adbry Counseling: I discussed with the patient the risks of tralokinumab including but not limited to eye infection and irritation, cold sores, injection site reactions, worsening of asthma, allergic reactions and increased risk of parasitic infection.  Live vaccines should be avoided while taking tralokinumab. The patient understands that monitoring is required and they must alert us or the primary physician if symptoms of infection or other concerning signs are noted.
Topical Metronidazole Pregnancy And Lactation Text: This medication is Pregnancy Category B and considered safe during pregnancy.  It is also considered safe to use while breastfeeding.
Azelaic Acid Counseling: Patient counseled that medicine may cause skin irritation and to avoid applying near the eyes.  In the event of skin irritation, the patient was advised to reduce the amount of the drug applied or use it less frequently.   The patient verbalized understanding of the proper use and possible adverse effects of azelaic acid.  All of the patient's questions and concerns were addressed.
Tremfya Counseling: I discussed with the patient the risks of guselkumab including but not limited to immunosuppression, serious infections, and drug reactions.  The patient understands that monitoring is required including a PPD at baseline and must alert us or the primary physician if symptoms of infection or other concerning signs are noted.
Oxybutynin Counseling:  I discussed with the patient the risks of oxybutynin including but not limited to skin rash, drowsiness, dry mouth, difficulty urinating, and blurred vision.
Doxepin Counseling:  Patient advised that the medication is sedating and not to drive a car after taking this medication. Patient informed of potential adverse effects including but not limited to dry mouth, urinary retention, and blurry vision.  The patient verbalized understanding of the proper use and possible adverse effects of doxepin.  All of the patient's questions and concerns were addressed.
Glycopyrrolate Counseling:  I discussed with the patient the risks of glycopyrrolate including but not limited to skin rash, drowsiness, dry mouth, difficulty urinating, and blurred vision.
Quinolones Counseling:  I discussed with the patient the risks of fluoroquinolones including but not limited to GI upset, allergic reaction, drug rash, diarrhea, dizziness, photosensitivity, yeast infections, liver function test abnormalities, tendonitis/tendon rupture.
Klisyri Pregnancy And Lactation Text: It is unknown if this medication can harm a developing fetus or if it is excreted in breast milk.
Bexarotene Counseling:  I discussed with the patient the risks of bexarotene including but not limited to hair loss, dry lips/skin/eyes, liver abnormalities, hyperlipidemia, pancreatitis, depression/suicidal ideation, photosensitivity, drug rash/allergic reactions, hypothyroidism, anemia, leukopenia, infection, cataracts, and teratogenicity.  Patient understands that they will need regular blood tests to check lipid profile, liver function tests, white blood cell count, thyroid function tests and pregnancy test if applicable.
Topical Retinoid counseling:  Patient advised to apply a pea-sized amount only at bedtime and wait 30 minutes after washing their face before applying.  If too drying, patient may add a non-comedogenic moisturizer. The patient verbalized understanding of the proper use and possible adverse effects of retinoids.  All of the patient's questions and concerns were addressed.
Spironolactone Counseling: Patient advised regarding risks of diarrhea, abdominal pain, hyperkalemia, birth defects (for female patients), liver toxicity and renal toxicity. The patient may need blood work to monitor liver and kidney function and potassium levels while on therapy. The patient verbalized understanding of the proper use and possible adverse effects of spironolactone.  All of the patient's questions and concerns were addressed.
Clindamycin Pregnancy And Lactation Text: This medication can be used in pregnancy if certain situations. Clindamycin is also present in breast milk.
Humira Counseling:  I discussed with the patient the risks of adalimumab including but not limited to myelosuppression, immunosuppression, autoimmune hepatitis, demyelinating diseases, lymphoma, and serious infections.  The patient understands that monitoring is required including a PPD at baseline and must alert us or the primary physician if symptoms of infection or other concerning signs are noted.
Protopic Pregnancy And Lactation Text: This medication is Pregnancy Category C. It is unknown if this medication is excreted in breast milk when applied topically.
Drysol Counseling:  I discussed with the patient the risks of drysol/aluminum chloride including but not limited to skin rash, itching, irritation, burning.
Valtrex Counseling: I discussed with the patient the risks of valacyclovir including but not limited to kidney damage, nausea, vomiting and severe allergy.  The patient understands that if the infection seems to be worsening or is not improving, they are to call.
Itraconazole Counseling:  I discussed with the patient the risks of itraconazole including but not limited to liver damage, nausea/vomiting, neuropathy, and severe allergy.  The patient understands that this medication is best absorbed when taken with acidic beverages such as non-diet cola or ginger ale.  The patient understands that monitoring is required including baseline LFTs and repeat LFTs at intervals.  The patient understands that they are to contact us or the primary physician if concerning signs are noted.
Topical Steroids Counseling: I discussed with the patient that prolonged use of topical steroids can result in the increased appearance of superficial blood vessels (telangiectasias), lightening (hypopigmentation) and thinning of the skin (atrophy).  Patient understands to avoid using high potency steroids in skin folds, the groin or the face.  The patient verbalized understanding of the proper use and possible adverse effects of topical steroids.  All of the patient's questions and concerns were addressed.
Azelaic Acid Pregnancy And Lactation Text: This medication is considered safe during pregnancy and breast feeding.
Minoxidil Counseling: Minoxidil is a topical medication which can increase blood flow where it is applied. It is uncertain how this medication increases hair growth. Side effects are uncommon and include stinging and allergic reactions.
Adbry Pregnancy And Lactation Text: It is unknown if this medication will adversely affect pregnancy or breast feeding.
Glycopyrrolate Pregnancy And Lactation Text: This medication is Pregnancy Category B and is considered safe during pregnancy. It is unknown if it is excreted breast milk.
Rinvoq Counseling: I discussed with the patient the risks of Rinvoq therapy including but not limited to upper respiratory tract infections, shingles, cold sores, bronchitis, nausea, cough, fever, acne, and headache. Live vaccines should be avoided.  This medication has been linked to serious infections; higher rate of mortality; malignancy and lymphoproliferative disorders; major adverse cardiovascular events; thrombosis; thrombocytopenia, anemia, and neutropenia; lipid elevations; liver enzyme elevations; and gastrointestinal perforations.
Doxepin Pregnancy And Lactation Text: This medication is Pregnancy Category C and it isn't known if it is safe during pregnancy. It is also excreted in breast milk and breast feeding isn't recommended.
Simponi Counseling:  I discussed with the patient the risks of golimumab including but not limited to myelosuppression, immunosuppression, autoimmune hepatitis, demyelinating diseases, lymphoma, and serious infections.  The patient understands that monitoring is required including a PPD at baseline and must alert us or the primary physician if symptoms of infection or other concerning signs are noted.
Klisyri Counseling:  I discussed with the patient the risks of Klisyri including but not limited to erythema, scaling, itching, weeping, crusting, and pain.
Bexarotene Pregnancy And Lactation Text: This medication is Pregnancy Category X and should not be given to women who are pregnant or may become pregnant. This medication should not be used if you are breast feeding.
Nsaids Counseling: NSAID Counseling: I discussed with the patient that NSAIDs should be taken with food. Prolonged use of NSAIDs can result in the development of stomach ulcers.  Patient advised to stop taking NSAIDs if abdominal pain occurs.  The patient verbalized understanding of the proper use and possible adverse effects of NSAIDs.  All of the patient's questions and concerns were addressed.
Doxycycline Counseling:  Patient counseled regarding possible photosensitivity and increased risk for sunburn.  Patient instructed to avoid sunlight, if possible.  When exposed to sunlight, patients should wear protective clothing, sunglasses, and sunscreen.  The patient was instructed to call the office immediately if the following severe adverse effects occur:  hearing changes, easy bruising/bleeding, severe headache, or vision changes.  The patient verbalized understanding of the proper use and possible adverse effects of doxycycline.  All of the patient's questions and concerns were addressed.
Clofazimine Counseling:  I discussed with the patient the risks of clofazimine including but not limited to skin and eye pigmentation, liver damage, nausea/vomiting, gastrointestinal bleeding and allergy.
Spironolactone Pregnancy And Lactation Text: This medication can cause feminization of the male fetus and should be avoided during pregnancy. The active metabolite is also found in breast milk.
Zoryve Counseling:  I discussed with the patient that Zoryve is not for use in the eyes, mouth or vagina. The most commonly reported side effects include diarrhea, headache, insomnia, application site pain, upper respiratory tract infections, and urinary tract infections.  All of the patient's questions and concerns were addressed.
Valtrex Pregnancy And Lactation Text: this medication is Pregnancy Category B and is considered safe during pregnancy. This medication is not directly found in breast milk but it's metabolite acyclovir is present.
Cimzia Counseling:  I discussed with the patient the risks of Cimzia including but not limited to immunosuppression, allergic reactions and infections.  The patient understands that monitoring is required including a PPD at baseline and must alert us or the primary physician if symptoms of infection or other concerning signs are noted.
Xolair Counseling:  Patient informed of potential adverse effects including but not limited to fever, muscle aches, rash and allergic reactions.  The patient verbalized understanding of the proper use and possible adverse effects of Xolair.  All of the patient's questions and concerns were addressed.
Qbrexza Counseling:  I discussed with the patient the risks of Qbrexza including but not limited to headache, mydriasis, blurred vision, dry eyes, nasal dryness, dry mouth, dry throat, dry skin, urinary hesitation, and constipation.  Local skin reactions including erythema, burning, stinging, and itching can also occur.
Benzoyl Peroxide Counseling: Patient counseled that medicine may cause skin irritation and bleach clothing.  In the event of skin irritation, the patient was advised to reduce the amount of the drug applied or use it less frequently.   The patient verbalized understanding of the proper use and possible adverse effects of benzoyl peroxide.  All of the patient's questions and concerns were addressed.
Propranolol Counseling:  I discussed with the patient the risks of propranolol including but not limited to low heart rate, low blood pressure, low blood sugar, restlessness and increased cold sensitivity. They should call the office if they experience any of these side effects.
Methotrexate Counseling:  Patient counseled regarding adverse effects of methotrexate including but not limited to nausea, vomiting, abnormalities in liver function tests. Patients may develop mouth sores, rash, diarrhea, and abnormalities in blood counts. The patient understands that monitoring is required including LFT's and blood counts.  There is a rare possibility of scarring of the liver and lung problems that can occur when taking methotrexate. Persistent nausea, loss of appetite, pale stools, dark urine, cough, and shortness of breath should be reported immediately. Patient advised to discontinue methotrexate treatment at least three months before attempting to become pregnant.  I discussed the need for folate supplements while taking methotrexate.  These supplements can decrease side effects during methotrexate treatment. The patient verbalized understanding of the proper use and possible adverse effects of methotrexate.  All of the patient's questions and concerns were addressed.

## 2023-06-23 NOTE — PROCEDURE: MIPS QUALITY
Quality 431: Preventive Care And Screening: Unhealthy Alcohol Use - Screening: Patient not identified as an unhealthy alcohol user when screened for unhealthy alcohol use using a systematic screening method
Quality 111:Pneumonia Vaccination Status For Older Adults: Patient received any pneumococcal conjugate or polysaccharide vaccine on or after their 60th birthday and before the end of the measurement period
Quality 226: Preventive Care And Screening: Tobacco Use: Screening And Cessation Intervention: Patient screened for tobacco use and is an ex/non-smoker
Quality 130: Documentation Of Current Medications In The Medical Record: Current Medications Documented
Detail Level: Simple

## 2023-08-15 ENCOUNTER — APPOINTMENT (RX ONLY)
Dept: URBAN - METROPOLITAN AREA CLINIC 15 | Facility: CLINIC | Age: 68
Setting detail: DERMATOLOGY
End: 2023-08-15

## 2023-08-15 DIAGNOSIS — Z71.89 OTHER SPECIFIED COUNSELING: ICD-10-CM

## 2023-08-15 DIAGNOSIS — L57.0 ACTINIC KERATOSIS: ICD-10-CM

## 2023-08-15 DIAGNOSIS — L81.4 OTHER MELANIN HYPERPIGMENTATION: ICD-10-CM

## 2023-08-15 DIAGNOSIS — L11.1 TRANSIENT ACANTHOLYTIC DERMATOSIS [GROVER]: ICD-10-CM

## 2023-08-15 PROCEDURE — 99213 OFFICE O/P EST LOW 20 MIN: CPT | Mod: 25

## 2023-08-15 PROCEDURE — ? ADDITIONAL NOTES

## 2023-08-15 PROCEDURE — ? COUNSELING

## 2023-08-15 PROCEDURE — ? LIQUID NITROGEN

## 2023-08-15 PROCEDURE — 17000 DESTRUCT PREMALG LESION: CPT

## 2023-08-15 PROCEDURE — ? PRESCRIPTION

## 2023-08-15 RX ORDER — BETAMETHASONE DIPROPIONATE 0.5 MG/G
1 OINTMENT TOPICAL BID
Qty: 45 | Refills: 0 | Status: ERX | COMMUNITY
Start: 2023-08-15

## 2023-08-15 RX ADMIN — BETAMETHASONE DIPROPIONATE 1: 0.5 OINTMENT TOPICAL at 00:00

## 2023-08-15 ASSESSMENT — LOCATION ZONE DERM
LOCATION ZONE: TRUNK
LOCATION ZONE: NOSE

## 2023-08-15 ASSESSMENT — LOCATION SIMPLE DESCRIPTION DERM
LOCATION SIMPLE: NOSE
LOCATION SIMPLE: ABDOMEN
LOCATION SIMPLE: LOWER BACK

## 2023-08-15 ASSESSMENT — LOCATION DETAILED DESCRIPTION DERM
LOCATION DETAILED: PERIUMBILICAL SKIN
LOCATION DETAILED: NASAL DORSUM
LOCATION DETAILED: SUPERIOR LUMBAR SPINE

## 2023-08-15 NOTE — PROCEDURE: ADDITIONAL NOTES
Additional Notes: Advised pt to apply CeraVe to the affected areas after the Betamethasone.
Detail Level: Detailed
Render Risk Assessment In Note?: no

## 2023-08-15 NOTE — PROCEDURE: LIQUID NITROGEN
Post-Care Instructions: I reviewed with the patient in detail post-care instructions. Patient is to wear sunprotection, and avoid picking at any of the treated lesions. Pt may apply Vaseline to crusted or scabbing areas.
Application Tool (Optional): Cry-AC
Duration Of Freeze Thaw-Cycle (Seconds): 5
Show Applicator Variable?: Yes
Render Note In Bullet Format When Appropriate: No
Number Of Freeze-Thaw Cycles: 3 freeze-thaw cycles
Consent: The patient's consent was obtained including but not limited to risks of crusting, scabbing, blistering, scarring, darker or lighter pigmentary change, recurrence, incomplete removal and infection.
Detail Level: Zone

## 2023-08-15 NOTE — PROCEDURE: COUNSELING
Sunscreen Recommendation Label Override: Broad Spectrum Sunscreen minimum 30+
Detail Level: Generalized
Detail Level: Zone

## 2023-09-12 ENCOUNTER — TELEPHONE (OUTPATIENT)
Dept: HEALTH INFORMATION MANAGEMENT | Facility: OTHER | Age: 68
End: 2023-09-12
